# Patient Record
Sex: FEMALE | URBAN - METROPOLITAN AREA
[De-identification: names, ages, dates, MRNs, and addresses within clinical notes are randomized per-mention and may not be internally consistent; named-entity substitution may affect disease eponyms.]

---

## 2018-04-25 ENCOUNTER — APPOINTMENT (RX ONLY)
Dept: URBAN - METROPOLITAN AREA CLINIC 91 | Facility: CLINIC | Age: 58
Setting detail: DERMATOLOGY
End: 2018-04-25

## 2018-04-25 DIAGNOSIS — D22 MELANOCYTIC NEVI: ICD-10-CM

## 2018-04-25 PROBLEM — D48.5 NEOPLASM OF UNCERTAIN BEHAVIOR OF SKIN: Status: ACTIVE | Noted: 2018-04-25

## 2018-04-25 PROBLEM — D22.39 MELANOCYTIC NEVI OF OTHER PARTS OF FACE: Status: ACTIVE | Noted: 2018-04-25

## 2018-04-25 PROCEDURE — 99202 OFFICE O/P NEW SF 15 MIN: CPT | Mod: 25

## 2018-04-25 PROCEDURE — ? BIOPSY BY SHAVE METHOD

## 2018-04-25 PROCEDURE — 11100: CPT

## 2018-04-25 PROCEDURE — ? OBSERVATION

## 2018-04-25 ASSESSMENT — LOCATION SIMPLE DESCRIPTION DERM: LOCATION SIMPLE: LEFT CHEEK

## 2018-04-25 ASSESSMENT — LOCATION ZONE DERM: LOCATION ZONE: FACE

## 2018-04-25 ASSESSMENT — LOCATION DETAILED DESCRIPTION DERM: LOCATION DETAILED: LEFT INFERIOR CENTRAL MALAR CHEEK

## 2018-04-25 NOTE — PROCEDURE: BIOPSY BY SHAVE METHOD
Cryotherapy Text: The wound bed was treated with cryotherapy after the biopsy was performed.
Wound Care: Vaseline
Hemostasis: Aluminum Chloride
Biopsy Type: H and E
Dressing: bandage
Bill For Surgical Tray: no
Size Of Lesion In Cm: 0.7
Render Post-Care Instructions In Note?: yes
Electrodesiccation Text: The wound bed was treated with electrodesiccation after the biopsy was performed.
Biopsy Method: Dermablade
Electrodesiccation And Curettage Text: The wound bed was treated with electrodesiccation and curettage after the biopsy was performed.
Post-care instructions reviewed with the patient in detail. The patient is to keep the biopsy site dry overnight. Remove the bandage and shower as normal with soap and water, dry the area, apply vaseline and a band-aid. Repeat this process daily for five days.
Silver Nitrate Text: The wound bed was treated with silver nitrate after the biopsy was performed.
Anesthesia Type: 1% lidocaine with epinephrine
Billing Type: [employer]
Additional Anesthesia Volume In Cc (Will Not Render If 0): 0
Type Of Destruction Used: Curettage
Detail Level: Detailed
Anesthesia Volume In Cc (Will Not Render If 0): 0.5
Consent was obtained and risks were reviewed including but not limited to scarring, infection, bleeding, scabbing, incomplete removal, nerve damage and allergy to anesthesia.
Notification Instructions: Patient will be notified of biopsy results. However, patient instructed to call the office if not contacted within 2 weeks.

## 2018-05-04 ENCOUNTER — APPOINTMENT (RX ONLY)
Dept: URBAN - METROPOLITAN AREA CLINIC 91 | Facility: CLINIC | Age: 58
Setting detail: DERMATOLOGY
End: 2018-05-04

## 2018-05-04 PROBLEM — C44.722 SQUAMOUS CELL CARCINOMA OF SKIN OF RIGHT LOWER LIMB, INCLUDING HIP: Status: ACTIVE | Noted: 2018-05-04

## 2018-05-04 PROCEDURE — ? BIOPSY BY SHAVE METHOD

## 2018-05-04 PROCEDURE — 11100: CPT

## 2018-05-04 PROCEDURE — ? DIAGNOSIS COMMENT

## 2018-05-04 NOTE — PROCEDURE: MIPS QUALITY
Detail Level: Detailed
Quality 130: Documentation Of Current Medications In The Medical Record: Current Medications Documented
Quality 131: Pain Assessment And Follow-Up: Pain assessment using a standardized tool is documented as negative, no follow-up plan required
Quality 111:Pneumonia Vaccination Status For Older Adults: Pneumococcal Vaccination Previously Received
Quality 110: Preventive Care And Screening: Influenza Immunization: Influenza Immunization previously received during influenza season

## 2018-05-04 NOTE — PROCEDURE: DIAGNOSIS COMMENT
Comment: Previous biopsy consistent with prurigo overlying cyst, can not rule out underlying process
Detail Level: Simple

## 2018-05-04 NOTE — PROCEDURE: BIOPSY BY SHAVE METHOD
Hemostasis: Aluminum Chloride
Detail Level: Detailed
Notification Instructions: Patient will be notified of biopsy results. However, patient instructed to call the office if not contacted within 2 weeks.
Cryotherapy Text: The wound bed was treated with cryotherapy after the biopsy was performed.
Biopsy Type: H and E
Billing Type: [employer]
Bill For Surgical Tray: no
Additional Anesthesia Volume In Cc (Will Not Render If 0): 0
Dressing: bandage
Biopsy Method: Dermablade
Anesthesia Volume In Cc (Will Not Render If 0): 0.5
Post-care instructions reviewed with the patient in detail. The patient is to keep the biopsy site dry overnight. Remove the bandage and shower as normal with soap and water, dry the area, apply vaseline and a band-aid. Repeat this process daily for five days.
Electrodesiccation And Curettage Text: The wound bed was treated with electrodesiccation and curettage after the biopsy was performed.
Wound Care: Vaseline
Size Of Lesion In Cm: 0.7
Render Post-Care Instructions In Note?: yes
Anesthesia Type: 1% lidocaine with epinephrine
Silver Nitrate Text: The wound bed was treated with silver nitrate after the biopsy was performed.
Consent was obtained and risks were reviewed including but not limited to scarring, infection, bleeding, scabbing, incomplete removal, nerve damage and allergy to anesthesia.
Electrodesiccation Text: The wound bed was treated with electrodesiccation after the biopsy was performed.
Type Of Destruction Used: Curettage

## 2018-06-05 ENCOUNTER — TELEPHONE (OUTPATIENT)
Dept: SCHEDULING | Facility: CLINIC | Age: 58
End: 2018-06-05

## 2018-06-05 NOTE — TELEPHONE ENCOUNTER
Pt states her mother was a pt of .  Pt has high cholesterol lab taken last week( no dates given)   pt INS is  [employer] -ID -[id number]- did not go through   ekg in [place]  No appt avail in a time manner

## 2018-06-14 NOTE — TELEPHONE ENCOUNTER
I spoke with Dr. Martin. He can see the pt on Monday, June 18th at 4:40pm. I will call her after 10am today but if she calls in the meantime, please offer her this appt. Thanks.

## 2018-06-15 ENCOUNTER — TELEPHONE (OUTPATIENT)
Dept: CARDIOLOGY | Facility: CLINIC | Age: 58
End: 2018-06-15

## 2018-06-15 NOTE — TELEPHONE ENCOUNTER
I called pt and left a voice message offering her an appt for Monday, June 18th at 4:40pm. I tentatively scheduled her for that time/date. Asked her to call the office to let us know if this works or not.

## 2018-06-25 NOTE — TELEPHONE ENCOUNTER
Dr. Martin can see pt on Friday, Juuly 20th at 3:40pm as a new pt. Called and left message. Mailed out new pt packet to pt.

## 2018-06-27 ENCOUNTER — TELEPHONE (OUTPATIENT)
Dept: SCHEDULING | Facility: CLINIC | Age: 58
End: 2018-06-27

## 2018-06-27 NOTE — TELEPHONE ENCOUNTER
Pt was scheduled for 7/20 and unfortunately cant make that day and time.  [place] on a Friday afternoon.      Pt is requesting to contact her directly to set up a day and time.      Please contact pt.

## 2018-07-10 ENCOUNTER — OFFICE VISIT (OUTPATIENT)
Dept: CARDIOLOGY | Facility: CLINIC | Age: 58
End: 2018-07-10
Payer: COMMERCIAL

## 2018-07-10 VITALS
HEART RATE: 58 BPM | DIASTOLIC BLOOD PRESSURE: 70 MMHG | HEIGHT: 61 IN | WEIGHT: 129 LBS | BODY MASS INDEX: 24.35 KG/M2 | SYSTOLIC BLOOD PRESSURE: 114 MMHG

## 2018-07-10 DIAGNOSIS — Z82.49 FAMILY HISTORY OF PREMATURE CORONARY ARTERY DISEASE: ICD-10-CM

## 2018-07-10 DIAGNOSIS — R94.31 EKG, ABNORMAL: ICD-10-CM

## 2018-07-10 DIAGNOSIS — E78.5 DYSLIPIDEMIA: Primary | ICD-10-CM

## 2018-07-10 DIAGNOSIS — E78.00 HYPERCHOLESTEROLEMIA: ICD-10-CM

## 2018-07-10 DIAGNOSIS — I34.1 MITRAL VALVE PROLAPSE: ICD-10-CM

## 2018-07-10 PROCEDURE — 93000 ELECTROCARDIOGRAM COMPLETE: CPT | Performed by: INTERNAL MEDICINE

## 2018-07-10 PROCEDURE — 99204 OFFICE O/P NEW MOD 45 MIN: CPT | Performed by: INTERNAL MEDICINE

## 2018-07-10 RX ORDER — ACETAMINOPHEN 500 MG
5000 TABLET ORAL DAILY
COMMUNITY

## 2018-07-10 RX ORDER — RISEDRONATE SODIUM 150 MG/1
1 TABLET, FILM COATED ORAL
Refills: 3 | COMMUNITY
Start: 2018-04-15 | End: 2021-10-08

## 2018-07-10 RX ORDER — PAROXETINE HYDROCHLORIDE HEMIHYDRATE 12.5 MG/1
0.5 TABLET, FILM COATED, EXTENDED RELEASE ORAL DAILY
Refills: 1 | COMMUNITY
Start: 2018-04-17

## 2018-07-10 RX ORDER — ROSUVASTATIN CALCIUM 20 MG/1
20 TABLET, COATED ORAL DAILY
Qty: 90 TABLET | Refills: 3 | Status: SHIPPED | OUTPATIENT
Start: 2018-07-10 | End: 2019-09-18

## 2018-07-10 RX ORDER — FLUTICASONE PROPIONATE 50 MCG
2 SPRAY, SUSPENSION (ML) NASAL 2 TIMES DAILY
Refills: 10 | COMMUNITY
Start: 2018-04-23 | End: 2020-07-20

## 2018-07-10 RX ORDER — AZELASTINE 1 MG/ML
2 SPRAY, METERED NASAL 2 TIMES DAILY
Refills: 11 | COMMUNITY
Start: 2018-04-23

## 2018-07-10 RX ORDER — GLUCOSAM/CHONDRO/HERB 149/HYAL 750-100 MG
1 TABLET ORAL 2 TIMES DAILY
COMMUNITY

## 2018-07-10 NOTE — PROGRESS NOTES
I the privilege of seeing your patient today to evaluate the following problems    1 abnormal EKG chronic likely secondary to her known mitral valve prolapse    2 hypercholesterolemia probably familial from her grandparents father.  With significantly elevated total cholesterol 277 and LDL particle #2252.  Statin therapy initiated 2018    3 mitral valve prolapse with previous palpitations, panic attacks    4 remote history of light smoking teenage years    5 family history of premature coronary artery disease in both grandfathers      Current Outpatient Prescriptions:   •  azelastine (ASTELIN) 137 mcg (0.1 %) nasal spray, Administer 2 sprays into each nostril 2 (two) times a day., Disp: , Rfl: 11  •  cholecalciferol, vitamin D3, (VITAMIN D3) 5,000 unit tablet, Take 5,000 Units by mouth daily., Disp: , Rfl:   •  fluticasone (FLONASE) 50 mcg/actuation nasal spray, Administer 2 sprays into each nostril 2 (two) times a day., Disp: , Rfl: 10  •  MAGNESIUM ORAL, Take 2 tablets by mouth nightly., Disp: , Rfl:   •  omega 3-dha-epa-fish oil (FISH OIL) 1,000 mg (120 mg-180 mg) capsule, Take 1 tablet by mouth 2 (two) times a day., Disp: , Rfl:   •  PARoxetine CR (PAXIL-CR) 12.5 mg 24 hr tablet, Take 0.5 tablets by mouth daily., Disp: , Rfl: 1  •  risedronate (ACTONEL) 150 mg tablet, Take 1 tablet by mouth every 30 (thirty) days., Disp: , Rfl: 3  •  rosuvastatin (CRESTOR) 20 mg tablet, Take 1 tablet (20 mg total) by mouth daily., Disp: 90 tablet, Rfl: 3     History 57 year old  postmenopausal very active woman seen for the above problems.  Recently she was started on rosuvastatin 5 mg because of significantly elevated total cholesterol LDL cholesterol and LDL particle number.  With this her lipids have modestly improved.  Coronary risk would include both grandfathers having premature coronary artery disease.  There issmoking for 30 years no diabetes or hypertension.    In addition her EKG was checked recently in in  [place] which was abnormal.  You compared this to her previous EKGs which for many years apparently have shown ST-T wave abnormalities which are thought to be nonspecific and likely related to her mitral valve prolapse.    Approximately 30 years ago she developed panic attacks palpitations and apparently a echocardiogram showed mitral valve prolapse.  Fortunately she has largely outgrown the symptoms and currently only has minor palpitations currently.    Currently she is physically active with competitive tennis and horseback riding.  No chest pain or shortness of breath.    Social history shows she is  2 sons active with tennis and riding no smoking in 30 years alcohol is social use.    Current medications Paxil 6.25 mg daily rosuvastatin 5 mg daily along with the other medications reviewed above.    Allergies to sulfa and Betadine both causing a rash.    Physical examination normal healthy appearing blood pressure 114/70 pulse regular rate and rhythm weight is 129.  Her eyes are normal no JVD normal carotid pulses no bruits lungs are clear grade 1 systolic murmur at the apex with no radiation abdomen nontender no masses organomegaly extremities no edema skin warm and dry normal color well-perfused.    EKG normal sinus rhythm nonspecific ST Tis similar to that seen previously.    Review of a coronary calcium score from 2016 0.    Assessment and plan    1 abnormal EKG likely secondary to mitral valve prolapse.  This is been stable for many years.    2 hypercholesterolemia likely familial to her father's side of the family.  There is a high level LDL particles and along with a family history she needs maximum cholesterol lowering.  I therefore increased the rosuvastatin to 20 mg daily and repeat blood studies in 3 or 4 months.    3 mitral valve prolapse manifested initially as palpitations panic attacks and with EKG abnormalities.  No signs or symptoms currently but this is causing any other problems.   Routine monitoring echocardiogram if things change

## 2018-07-10 NOTE — LETTER
July 10, 2018     Arturo Pruitt MD  [address]  [facility] [address]    Patient: Hannah Hester   YOB: 1960   Date of Visit: 7/10/2018       Dear Dr. Pruitt:    Thank you for referring Hannah Hester to me for evaluation. Below are my notes for this consultation.    If you have questions, please do not hesitate to call me. I look forward to following your patient along with you.         Sincerely,        Abel Galaviz MD        CC: No Recipients  Abel Galaviz MD  7/10/2018  2:34 PM  Sign at close encounter  I the privilege of seeing your patient today to evaluate the following problems    1 abnormal EKG chronic likely secondary to her known mitral valve prolapse    2 hypercholesterolemia probably familial from her grandparents father.  With significantly elevated total cholesterol 277 and LDL particle #2252.  Statin therapy initiated 2018    3 mitral valve prolapse with previous palpitations, panic attacks    4 remote history of light smoking teenage years    5 family history of premature coronary artery disease in both grandfathers      Current Outpatient Prescriptions:   •  azelastine (ASTELIN) 137 mcg (0.1 %) nasal spray, Administer 2 sprays into each nostril 2 (two) times a day., Disp: , Rfl: 11  •  cholecalciferol, vitamin D3, (VITAMIN D3) 5,000 unit tablet, Take 5,000 Units by mouth daily., Disp: , Rfl:   •  fluticasone (FLONASE) 50 mcg/actuation nasal spray, Administer 2 sprays into each nostril 2 (two) times a day., Disp: , Rfl: 10  •  MAGNESIUM ORAL, Take 2 tablets by mouth nightly., Disp: , Rfl:   •  omega 3-dha-epa-fish oil (FISH OIL) 1,000 mg (120 mg-180 mg) capsule, Take 1 tablet by mouth 2 (two) times a day., Disp: , Rfl:   •  PARoxetine CR (PAXIL-CR) 12.5 mg 24 hr tablet, Take 0.5 tablets by mouth daily., Disp: , Rfl: 1  •  risedronate (ACTONEL) 150 mg tablet, Take 1 tablet by mouth every 30 (thirty) days., Disp: , Rfl: 3  •  rosuvastatin (CRESTOR) 20 mg  tablet, Take 1 tablet (20 mg total) by mouth daily., Disp: 90 tablet, Rfl: 3     History 57 year old  postmenopausal very active woman seen for the above problems.  Recently she was started on rosuvastatin 5 mg because of significantly elevated total cholesterol LDL cholesterol and LDL particle number.  With this her lipids have modestly improved.  Coronary risk would include both grandfathers having premature coronary artery disease.  There issmoking for 30 years no diabetes or hypertension.    In addition her EKG was checked recently in in [place] which was abnormal.  You compared this to her previous EKGs which for many years apparently have shown ST-T wave abnormalities which are thought to be nonspecific and likely related to her mitral valve prolapse.    Approximately 30 years ago she developed panic attacks palpitations and apparently a echocardiogram showed mitral valve prolapse.  Fortunately she has largely outgrown the symptoms and currently only has minor palpitations currently.    Currently she is physically active with competitive tennis and horseback riding.  No chest pain or shortness of breath.    Social history shows she is  2 sons active with tennis and riding no smoking in 30 years alcohol is social use.    Current medications Paxil 6.25 mg daily rosuvastatin 5 mg daily along with the other medications reviewed above.    Allergies to sulfa and Betadine both causing a rash.    Physical examination normal healthy appearing blood pressure 114/70 pulse regular rate and rhythm weight is 129.  Her eyes are normal no JVD normal carotid pulses no bruits lungs are clear grade 1 systolic murmur at the apex with no radiation abdomen nontender no masses organomegaly extremities no edema skin warm and dry normal color well-perfused.    EKG normal sinus rhythm nonspecific ST Tis similar to that seen previously.    Review of a coronary calcium score from 2016 0.    Assessment and plan    1 abnormal  EKG likely secondary to mitral valve prolapse.  This is been stable for many years.    2 hypercholesterolemia likely familial to her father's side of the family.  There is a high level LDL particles and along with a family history she needs maximum cholesterol lowering.  I therefore increased the rosuvastatin to 20 mg daily and repeat blood studies in 3 or 4 months.    3 mitral valve prolapse manifested initially as palpitations panic attacks and with EKG abnormalities.  No signs or symptoms currently but this is causing any other problems.  Routine monitoring echocardiogram if things change

## 2018-07-12 NOTE — TELEPHONE ENCOUNTER
Pt cancelled two new pt appts with Dr. Martin in the past and she currently sees Dr. Galaviz. She currently has an appt with him scheduled on 10/8/18.

## 2018-10-11 LAB
ALBUMIN SERPL-MCNC: 4.1 G/DL (ref 3.6–5.1)
ALBUMIN/GLOB SERPL: 1.8 (CALC) (ref 1–2.5)
ALP SERPL-CCNC: 51 U/L (ref 33–130)
ALT SERPL-CCNC: 15 U/L (ref 6–29)
AST SERPL-CCNC: 17 U/L (ref 10–35)
BILIRUB SERPL-MCNC: 0.4 MG/DL (ref 0.2–1.2)
BUN SERPL-MCNC: 16 MG/DL (ref 7–25)
BUN/CREAT SERPL: NORMAL (CALC) (ref 6–22)
CALCIUM SERPL-MCNC: 8.6 MG/DL (ref 8.6–10.4)
CHLORIDE SERPL-SCNC: 106 MMOL/L (ref 98–110)
CHOLEST SERPL-MCNC: 186 MG/DL
CHOLEST/HDLC SERPL: 2 (CALC)
CO2 SERPL-SCNC: 27 MMOL/L (ref 20–32)
CREAT SERPL-MCNC: 0.53 MG/DL (ref 0.5–1.05)
GFR SERPL CREATININE-BSD FRML MDRD: 105 ML/MIN/1.73M2
GLOBULIN SER CALC-MCNC: 2.3 G/DL (CALC) (ref 1.9–3.7)
GLUCOSE SERPL-MCNC: 96 MG/DL (ref 65–99)
HDLC SERPL-MCNC: 91 MG/DL
LDLC SERPL CALC-MCNC: 81 MG/DL (CALC)
NONHDLC SERPL-MCNC: 95 MG/DL (CALC)
POTASSIUM SERPL-SCNC: 4.1 MMOL/L (ref 3.5–5.3)
PROT SERPL-MCNC: 6.4 G/DL (ref 6.1–8.1)
SODIUM SERPL-SCNC: 140 MMOL/L (ref 135–146)
TRIGL SERPL-MCNC: 61 MG/DL

## 2018-10-15 ENCOUNTER — OFFICE VISIT (OUTPATIENT)
Dept: CARDIOLOGY | Facility: CLINIC | Age: 58
End: 2018-10-15
Payer: COMMERCIAL

## 2018-10-15 VITALS
HEIGHT: 61 IN | WEIGHT: 131 LBS | DIASTOLIC BLOOD PRESSURE: 62 MMHG | SYSTOLIC BLOOD PRESSURE: 100 MMHG | BODY MASS INDEX: 24.73 KG/M2

## 2018-10-15 DIAGNOSIS — E78.00 HYPERCHOLESTEROLEMIA: Primary | ICD-10-CM

## 2018-10-15 DIAGNOSIS — R94.31 EKG, ABNORMAL: ICD-10-CM

## 2018-10-15 DIAGNOSIS — I34.1 MITRAL VALVE PROLAPSE: ICD-10-CM

## 2018-10-15 DIAGNOSIS — Z82.49 FAMILY HISTORY OF PREMATURE CORONARY ARTERY DISEASE: ICD-10-CM

## 2018-10-15 PROCEDURE — 99213 OFFICE O/P EST LOW 20 MIN: CPT | Performed by: INTERNAL MEDICINE

## 2018-10-15 NOTE — LETTER
October 15, 2018     Gilbert Hernandez, DO  [address]  [place]  [facility] [address]    Patient: Hannah Hester   YOB: 1960   Date of Visit: 10/15/2018       Dear Dr. Hernandez:    Thank you for referring Hannah Hester to me for evaluation. Below are my notes for this consultation.    If you have questions, please do not hesitate to call me. I look forward to following your patient along with you.         Sincerely,        Abel Galaviz MD        CC: No Recipients  Abel Galaviz MD  10/15/2018  2:52 PM  Sign at close encounter     1. Abnormal EKG with nonspecific ST-T waves chronic likely secondary to her known mitral valve prolapse     2. Hypercholesterolemia probably familial.  With significantly elevated total cholesterol 277 and LDL particle #2252.  Statin therapy initiated 2018 coronary calcium score 2016 of 0     3. Mitral valve prolapse with previous palpitations, panic attacks     4.  remote history of light smoking teenage years     5 . family history of premature coronary artery disease in both grandfathers      Current Outpatient Prescriptions:   •  azelastine (ASTELIN) 137 mcg (0.1 %) nasal spray, Administer 2 sprays into each nostril 2 (two) times a day., Disp: , Rfl: 11  •  cholecalciferol, vitamin D3, (VITAMIN D3) 5,000 unit tablet, Take 5,000 Units by mouth daily., Disp: , Rfl:   •  fluticasone (FLONASE) 50 mcg/actuation nasal spray, Administer 2 sprays into each nostril 2 (two) times a day., Disp: , Rfl: 10  •  MAGNESIUM ORAL, Take 2 tablets by mouth nightly., Disp: , Rfl:   •  omega 3-dha-epa-fish oil (FISH OIL) 1,000 mg (120 mg-180 mg) capsule, Take 1 tablet by mouth 2 (two) times a day., Disp: , Rfl:   •  PARoxetine CR (PAXIL-CR) 12.5 mg 24 hr tablet, Take 0.5 tablets by mouth daily., Disp: , Rfl: 1  •  risedronate (ACTONEL) 150 mg tablet, Take 1 tablet by mouth every 30 (thirty) days., Disp: , Rfl: 3  •  rosuvastatin (CRESTOR) 20 mg tablet, Take 1 tablet (20 mg  total) by mouth daily., Disp: 90 tablet, Rfl: 3    Interim history    Since her last visit of July she has been able to take Crestor 20 mg every other day.  Daily doses caused muscle cramps.  She otherwise reports feeling fine offering no complaints.    Past family and social history are reviewed above.    New blood studies show the cholesterol down 90 points to 186 LDL 81 HDL 91 triglycerides 61 fasting blood sugar 96.  All other chemistries are normal    Examination shows she looks well blood pressure is 100/62 pulse is regular rate and rhythm the weight is 131 lungs are clear heart sounds are normal no murmurs or clicks.  No edema.    Assessment and plan    1.  Hypercholesterolemia familial statin treated lipids are much much better on the above therapy.  LDL cholesterol 81 HDL is 91 total cholesterol 186.  She did have a very high LDL particle number.  She is having difficulty taking higher doses of statin because of muscle cramps.  Fortunately she has a 0 coronary calcium score.  Going forward she will take as much Crestor she can tolerate.  If this becomes a problem we would recommend using ezetimibe 10 mg daily to her regimen    2.  Mitral valve prolapse asymptomatic with no findings today.

## 2018-10-15 NOTE — PROGRESS NOTES
1. Abnormal EKG with nonspecific ST-T waves chronic likely secondary to her known mitral valve prolapse     2. Hypercholesterolemia probably familial.  With significantly elevated total cholesterol 277 and LDL particle #2252.  Statin therapy initiated 2018 coronary calcium score 2016 of 0     3. Mitral valve prolapse with previous palpitations, panic attacks     4.  remote history of light smoking teenage years     5 . family history of premature coronary artery disease in both grandfathers      Current Outpatient Prescriptions:   •  azelastine (ASTELIN) 137 mcg (0.1 %) nasal spray, Administer 2 sprays into each nostril 2 (two) times a day., Disp: , Rfl: 11  •  cholecalciferol, vitamin D3, (VITAMIN D3) 5,000 unit tablet, Take 5,000 Units by mouth daily., Disp: , Rfl:   •  fluticasone (FLONASE) 50 mcg/actuation nasal spray, Administer 2 sprays into each nostril 2 (two) times a day., Disp: , Rfl: 10  •  MAGNESIUM ORAL, Take 2 tablets by mouth nightly., Disp: , Rfl:   •  omega 3-dha-epa-fish oil (FISH OIL) 1,000 mg (120 mg-180 mg) capsule, Take 1 tablet by mouth 2 (two) times a day., Disp: , Rfl:   •  PARoxetine CR (PAXIL-CR) 12.5 mg 24 hr tablet, Take 0.5 tablets by mouth daily., Disp: , Rfl: 1  •  risedronate (ACTONEL) 150 mg tablet, Take 1 tablet by mouth every 30 (thirty) days., Disp: , Rfl: 3  •  rosuvastatin (CRESTOR) 20 mg tablet, Take 1 tablet (20 mg total) by mouth daily., Disp: 90 tablet, Rfl: 3    Interim history    Since her last visit of July she has been able to take Crestor 20 mg every other day.  Daily doses caused muscle cramps.  She otherwise reports feeling fine offering no complaints.    Past family and social history are reviewed above.    New blood studies show the cholesterol down 90 points to 186 LDL 81 HDL 91 triglycerides 61 fasting blood sugar 96.  All other chemistries are normal    Examination shows she looks well blood pressure is 100/62 pulse is regular rate and rhythm the weight is  131 lungs are clear heart sounds are normal no murmurs or clicks.  No edema.    Assessment and plan    1.  Hypercholesterolemia familial statin treated lipids are much much better on the above therapy.  LDL cholesterol 81 HDL is 91 total cholesterol 186.  She did have a very high LDL particle number.  She is having difficulty taking higher doses of statin because of muscle cramps.  Fortunately she has a 0 coronary calcium score.  Going forward she will take as much Crestor she can tolerate.  If this becomes a problem we would recommend using ezetimibe 10 mg daily to her regimen    2.  Mitral valve prolapse asymptomatic with no findings today.

## 2018-10-27 LAB
APO B SERPL-MCNC: 77 MG/DL (ref 49–103)
CHOLEST SERPL-MCNC: 214 MG/DL
CHOLEST/HDLC SERPL: 2.5 CALC
HDLC SERPL-MCNC: 86 MG/DL
HLD.LARGE SERPL-SCNC: 7443 NMOL/L (ref 3966–11938)
LDL SERPL QN: 226.1 ANGSTROM
LDL SERPL-SCNC: 1264 NMOL/L (ref 732–2035)
LDL SMALL SERPL-SCNC: 156 NMOL/L (ref 75–452)
LDLC REAL SIZE PAT SERPL: ABNORMAL PATTERN
LDLC SERPL CALC-MCNC: 111 MG/DL
LPA SERPL-SCNC: 143 NMOL/L
NONHDLC SERPL-MCNC: 128 MG/DL (CALC)
QST LDL MEDIUM: 251 NMOL/L (ref 122–498)
TRIGL SERPL-MCNC: 84 MG/DL

## 2018-11-06 ENCOUNTER — TELEPHONE (OUTPATIENT)
Dept: CARDIOLOGY | Facility: CLINIC | Age: 58
End: 2018-11-06

## 2018-11-06 NOTE — TELEPHONE ENCOUNTER
Pt requesting to have call back from doctor regarding test results.  Please contact pt [phone]

## 2018-11-09 NOTE — TELEPHONE ENCOUNTER
Pt calling back to get results that were done by [facility]. 2nd call. Pt can be reached at [phone]

## 2018-12-21 ENCOUNTER — APPOINTMENT (RX ONLY)
Dept: URBAN - METROPOLITAN AREA CLINIC 91 | Facility: CLINIC | Age: 58
Setting detail: DERMATOLOGY
End: 2018-12-21

## 2018-12-21 DIAGNOSIS — D22 MELANOCYTIC NEVI: ICD-10-CM

## 2018-12-21 DIAGNOSIS — L81.4 OTHER MELANIN HYPERPIGMENTATION: ICD-10-CM

## 2018-12-21 DIAGNOSIS — L56.8 OTHER SPECIFIED ACUTE SKIN CHANGES DUE TO ULTRAVIOLET RADIATION: ICD-10-CM

## 2018-12-21 DIAGNOSIS — Z71.89 OTHER SPECIFIED COUNSELING: ICD-10-CM

## 2018-12-21 PROBLEM — D22.5 MELANOCYTIC NEVI OF TRUNK: Status: ACTIVE | Noted: 2018-12-21

## 2018-12-21 PROBLEM — L70.0 ACNE VULGARIS: Status: ACTIVE | Noted: 2018-12-21

## 2018-12-21 PROCEDURE — ? COUNSELING

## 2018-12-21 PROCEDURE — 99214 OFFICE O/P EST MOD 30 MIN: CPT

## 2018-12-21 ASSESSMENT — LOCATION DETAILED DESCRIPTION DERM
LOCATION DETAILED: RIGHT PROXIMAL DORSAL FOREARM
LOCATION DETAILED: RIGHT PROXIMAL POSTERIOR UPPER ARM
LOCATION DETAILED: STERNAL NOTCH
LOCATION DETAILED: LEFT DISTAL POSTERIOR UPPER ARM
LOCATION DETAILED: LEFT FOREHEAD
LOCATION DETAILED: RIGHT SUPERIOR MEDIAL UPPER BACK
LOCATION DETAILED: LEFT PROXIMAL DORSAL FOREARM
LOCATION DETAILED: RIGHT INFERIOR MEDIAL MIDBACK

## 2018-12-21 ASSESSMENT — LOCATION SIMPLE DESCRIPTION DERM
LOCATION SIMPLE: RIGHT POSTERIOR UPPER ARM
LOCATION SIMPLE: LEFT FOREHEAD
LOCATION SIMPLE: LEFT FOREARM
LOCATION SIMPLE: RIGHT FOREARM
LOCATION SIMPLE: RIGHT LOWER BACK
LOCATION SIMPLE: RIGHT UPPER BACK
LOCATION SIMPLE: CHEST
LOCATION SIMPLE: LEFT POSTERIOR UPPER ARM

## 2018-12-21 ASSESSMENT — LOCATION ZONE DERM
LOCATION ZONE: TRUNK
LOCATION ZONE: ARM
LOCATION ZONE: FACE

## 2019-01-02 ENCOUNTER — TELEPHONE (OUTPATIENT)
Dept: SCHEDULING | Facility: CLINIC | Age: 59
End: 2019-01-02

## 2019-01-02 NOTE — TELEPHONE ENCOUNTER
Pt called to report medication, crestor 20mg daily is giving her muscle aches/soreness/spasms and pt would like to switch to lipitor as discussed with Dr. Galaviz     Please send new prescription to [facility] in [place], [place] and pt is requesting a call back [phone] once new medication is called into the pharmacy.

## 2019-01-03 ENCOUNTER — TELEPHONE (OUTPATIENT)
Dept: CARDIOLOGY | Facility: CLINIC | Age: 59
End: 2019-01-03

## 2019-01-03 NOTE — TELEPHONE ENCOUNTER
Per Dr. Galaviz, have patient stop the Crestor 20 mg for 2 weeks, then start the Crestor 20 mg TAKING 1/2 TAB for 3 days per week. LVM on patient home number, BB

## 2019-04-16 ENCOUNTER — APPOINTMENT (RX ONLY)
Dept: URBAN - METROPOLITAN AREA CLINIC 91 | Facility: CLINIC | Age: 59
Setting detail: DERMATOLOGY
End: 2019-04-16

## 2019-04-16 DIAGNOSIS — L98.8 OTHER SPECIFIED DISORDERS OF THE SKIN AND SUBCUTANEOUS TISSUE: ICD-10-CM

## 2019-04-16 PROBLEM — E78.5 HYPERLIPIDEMIA, UNSPECIFIED: Status: ACTIVE | Noted: 2019-04-16

## 2019-04-16 PROCEDURE — ? DIAGNOSIS COMMENT

## 2019-04-16 PROCEDURE — ? COUNSELING

## 2019-04-16 PROCEDURE — ? COSMETIC CONSULTATION: FILLERS

## 2019-04-16 PROCEDURE — ? COSMETIC CONSULTATION: BOTULINUM TOXIN

## 2019-04-16 ASSESSMENT — LOCATION ZONE DERM: LOCATION ZONE: FACE

## 2019-04-16 ASSESSMENT — LOCATION DETAILED DESCRIPTION DERM
LOCATION DETAILED: LEFT SUPERIOR LATERAL MALAR CHEEK
LOCATION DETAILED: LEFT INFERIOR CENTRAL MALAR CHEEK
LOCATION DETAILED: LEFT INFERIOR FOREHEAD
LOCATION DETAILED: RIGHT SUPERIOR LATERAL MALAR CHEEK
LOCATION DETAILED: RIGHT INFERIOR FOREHEAD
LOCATION DETAILED: RIGHT INFERIOR MEDIAL MALAR CHEEK

## 2019-04-16 ASSESSMENT — LOCATION SIMPLE DESCRIPTION DERM
LOCATION SIMPLE: LEFT FOREHEAD
LOCATION SIMPLE: RIGHT CHEEK
LOCATION SIMPLE: RIGHT FOREHEAD
LOCATION SIMPLE: LEFT CHEEK

## 2019-05-01 ENCOUNTER — APPOINTMENT (RX ONLY)
Dept: URBAN - METROPOLITAN AREA CLINIC 91 | Facility: CLINIC | Age: 59
Setting detail: DERMATOLOGY
End: 2019-05-01

## 2019-05-01 DIAGNOSIS — L98.8 OTHER SPECIFIED DISORDERS OF THE SKIN AND SUBCUTANEOUS TISSUE: ICD-10-CM

## 2019-05-01 PROBLEM — L23.7 ALLERGIC CONTACT DERMATITIS DUE TO PLANTS, EXCEPT FOOD: Status: ACTIVE | Noted: 2019-05-01

## 2019-05-01 PROBLEM — F41.9 ANXIETY DISORDER, UNSPECIFIED: Status: ACTIVE | Noted: 2019-05-01

## 2019-05-01 PROCEDURE — ? BOTOX (U OR CC)

## 2019-05-01 PROCEDURE — ? FILLERS (CC)

## 2019-05-01 PROCEDURE — ? COUNSELING

## 2019-05-01 ASSESSMENT — LOCATION DETAILED DESCRIPTION DERM
LOCATION DETAILED: RIGHT CENTRAL MALAR CHEEK
LOCATION DETAILED: RIGHT INFERIOR MEDIAL MALAR CHEEK
LOCATION DETAILED: RIGHT SUPERIOR MEDIAL BUCCAL CHEEK
LOCATION DETAILED: NASAL ROOT
LOCATION DETAILED: RIGHT CENTRAL EYEBROW
LOCATION DETAILED: LEFT MEDIAL EYEBROW
LOCATION DETAILED: RIGHT NASOLABIAL FOLD
LOCATION DETAILED: LEFT NASOLABIAL FOLD
LOCATION DETAILED: LEFT CENTRAL EYEBROW
LOCATION DETAILED: RIGHT MEDIAL EYEBROW
LOCATION DETAILED: LEFT SUPERIOR MEDIAL BUCCAL CHEEK
LOCATION DETAILED: LEFT INFERIOR MEDIAL MALAR CHEEK
LOCATION DETAILED: LEFT INFERIOR TEMPLE
LOCATION DETAILED: LEFT CENTRAL MALAR CHEEK
LOCATION DETAILED: RIGHT SUPERIOR CENTRAL MALAR CHEEK
LOCATION DETAILED: RIGHT MID TEMPLE
LOCATION DETAILED: LEFT SUPERIOR LATERAL MALAR CHEEK

## 2019-05-01 ASSESSMENT — LOCATION SIMPLE DESCRIPTION DERM
LOCATION SIMPLE: LEFT LIP
LOCATION SIMPLE: RIGHT EYEBROW
LOCATION SIMPLE: RIGHT LIP
LOCATION SIMPLE: RIGHT CHEEK
LOCATION SIMPLE: LEFT CHEEK
LOCATION SIMPLE: LEFT TEMPLE
LOCATION SIMPLE: RIGHT TEMPLE
LOCATION SIMPLE: NOSE
LOCATION SIMPLE: LEFT EYEBROW

## 2019-05-01 ASSESSMENT — LOCATION ZONE DERM
LOCATION ZONE: NOSE
LOCATION ZONE: LIP
LOCATION ZONE: FACE

## 2019-05-01 NOTE — PROCEDURE: BOTOX (U OR CC)
Additional Area 3 Units: 0
Quantity Per Injection Site (Units Or Cc): 4 U
Post-Care Instructions: Patient instructed to not lie down for 4 hours and limit physical activity for 24 hours. Patient instructed not to travel by airplane for 48 hours.
Price (Use Numbers Only, No Special Characters Or $): [address]
Nasal Root Units/Cc: 4
Detail Level: Detailed
Periorbital Skin Units/Cc: 1 [facility]
Glabellar Complex Units: 217 [facility]
Including Pricing Information In The Note: No
Consent: Written consent obtained. Risks include but not limited to lid/brow ptosis, bruising, swelling, diplopia, temporary effect, incomplete chemical denervation.
Quantity Per Injection Site (Units Or Cc): 8 U
Expiration Date (Month Year): 80\1099
Lot #: [id number]
Document As Units Or Cc?: units

## 2019-05-01 NOTE — PROCEDURE: FILLERS (CC)
Quantity Per Injection Site (Cc): 0.05 cc
Additional Anesthesia Volume In Cc: 6
Post-Care Instructions: Patient instructed to apply ice to reduce swelling.
Expiration Date (Month Year): 2020-01-23
Additional Area 1 Volume In Cc: 0
Anesthesia Volume In Cc: 0.5
Detail Level: Detailed
Nasolabial Folds Filler Volume In Cc: 1
Filler: Juvederm Ultra XC
Price (Use Numbers Only, No Special Characters Or $): [address] [employer]
Consent: Written consent obtained. Risks include but not limited to bruising, beading, irregular texture, ulceration, infection, allergic reaction, scar formation, incomplete augmentation, temporary nature, procedural pain.
Lot #: [id number]

## 2019-05-13 ENCOUNTER — TELEPHONE (OUTPATIENT)
Dept: CARDIOLOGY | Facility: CLINIC | Age: 59
End: 2019-05-13

## 2019-05-13 NOTE — TELEPHONE ENCOUNTER
Dr. Galaviz patient.      Patient has an appointment to see Dr. Galaviz on 5/20/18 and wants to know if Dr. Galaviz would like her to have any lab work performed in anticipation of her visit. She had her last screen of blood work in 10/2018. She can be reached at [phone], thanks.

## 2019-05-20 ENCOUNTER — OFFICE VISIT (OUTPATIENT)
Dept: CARDIOLOGY | Facility: CLINIC | Age: 59
End: 2019-05-20
Payer: COMMERCIAL

## 2019-05-20 VITALS
SYSTOLIC BLOOD PRESSURE: 100 MMHG | WEIGHT: 133 LBS | HEIGHT: 61 IN | BODY MASS INDEX: 25.11 KG/M2 | DIASTOLIC BLOOD PRESSURE: 68 MMHG

## 2019-05-20 DIAGNOSIS — R94.31 EKG, ABNORMAL: ICD-10-CM

## 2019-05-20 DIAGNOSIS — E78.00 PURE HYPERCHOLESTEROLEMIA: Primary | ICD-10-CM

## 2019-05-20 DIAGNOSIS — E78.00 HYPERCHOLESTEROLEMIA: ICD-10-CM

## 2019-05-20 DIAGNOSIS — I34.1 MITRAL VALVE PROLAPSE: ICD-10-CM

## 2019-05-20 DIAGNOSIS — I34.1 MVP (MITRAL VALVE PROLAPSE): ICD-10-CM

## 2019-05-20 PROCEDURE — 99213 OFFICE O/P EST LOW 20 MIN: CPT | Performed by: INTERNAL MEDICINE

## 2019-05-20 PROCEDURE — 93000 ELECTROCARDIOGRAM COMPLETE: CPT | Performed by: INTERNAL MEDICINE

## 2019-05-20 RX ORDER — EZETIMIBE 10 MG/1
10 TABLET ORAL NIGHTLY
Qty: 90 TABLET | Refills: 1 | Status: SHIPPED | OUTPATIENT
Start: 2019-05-20 | End: 2019-09-18

## 2019-05-20 RX ORDER — ASPIRIN 81 MG/1
81 TABLET ORAL DAILY
Qty: 90 TABLET | Refills: 1 | Status: SHIPPED | OUTPATIENT
Start: 2019-05-20 | End: 2023-06-05 | Stop reason: SDUPTHER

## 2019-05-20 RX ORDER — ACETAMINOPHEN 160 MG/5ML
200 SUSPENSION, ORAL (FINAL DOSE FORM) ORAL DAILY
COMMUNITY

## 2019-05-20 RX ORDER — DIMENHYDRINATE 50 MG
50 TABLET ORAL NIGHTLY PRN
COMMUNITY
End: 2019-05-20

## 2019-05-20 NOTE — LETTER
May 20, 2019     Gilbert Hernandez, DO  [address]  [facility] [address]    Patient: Hannah Hester   YOB: 1960   Date of Visit: 5/20/2019       Dear Dr. Hernandez:    Thank you for referring Hannah Hester to me for evaluation. Below are my notes for this consultation.    If you have questions, please do not hesitate to call me. I look forward to following your patient along with you.         Sincerely,        Abel Galaviz MD        CC: No Recipients  Abel Galaviz MD  5/20/2019  8:39 AM  Sign at close encounter     1. Abnormal EKG with nonspecific ST-T waves chronic likely secondary to her known mitral valve prolapse.   2. Hypercholesterolemia probably familial.  With significantly elevated total cholesterol 277 and LDL particle #2252.  Statin therapy initiated 2018 coronary calcium score 2016 of 0   3. Mitral valve prolapse with previous palpitations, panic attacks   4.  remote history of light smoking    5 . family history of premature coronary artery disease in both grandfathers

## 2019-05-20 NOTE — PROGRESS NOTES
1. Abnormal EKG with nonspecific ST-T waves chronic likely secondary to her known mitral valve prolapse.   2. Hypercholesterolemia probably familial.  With significantly elevated total cholesterol 277 and LDL particle #[id number].  Elevated LP(a).  Stin therapy initiated 2018 coronary calcium score 2016 of 0.  Statin dosing limited by myalgias  3. Mitral valve prolapse with previous palpitations, panic attacks   4.  remote history of light smoking    5 . family history of premature coronary artery disease in both grandfathers      Interim history: Patient was seen today to reassess the above problems as reviewed above.  Since her last visit of 6 months ago she reports feeling well and offers no complaints.  There is been no chest pain shortness of breath palpitations dizziness edema or compliance problems.  The dose of rosuvastatin had to be reduced because of muscle spasms which have improved but not completely resolved    The past family social history are further reviewed and updated above.    Physical examination healthy-appearing blood pressure 100/68 pulse regular rate and rhythm at 80 and the weight is 133 which is stable.  Her eyes are normal, no JVD normal carotid pulses.  The lungs are clear.  Heart sounds are normal with a early systolic click but no murmur.  Abdomen no masses organomegaly.  Extremities warm and dry no edema.    Lab studies last performed October 2018 the total cholesterol 214  HDL 86 triglycerides 84.  Advanced lipid testings include APO B level 77 which is normal and LDL particle number number of [id number] which is borderline.  The LP(a) elevated to 143.  Chemistry panel normal.    EKG: Normal sinus rhythm nonspecific ST-T wave abnormality stable        Assessment and plan:    1.  Hyperlipidemia as noted above not fully controlled on present dose of statin.  Higher doses of cause muscle symptoms.  Currently the LDL is 111 APO B level normal at 77 non-HDL cholesterol slightly high at 128  but LP(a) elevated 143.  More intensive LDL cholesterol lowering is therefore indicated given the family history.  I have therefore added ezetimibe 10 mg daily to the regimen and will repeat lipid values in few months.  With the elevated LP(a) causing a potential prothrombotic state low-dose aspirin is recommended    2.  Mitral valve prolapse with no signs or symptoms this is causing a problem.  Routine monitoring.  Echocardiogram if something changes.    3.  Abnormal EKG with nonspecific ST-T's which is been seen in the past are likely secondary to her known mitral valve prolapse.  Stable.  Routine monitoring.

## 2019-09-12 ENCOUNTER — TELEPHONE (OUTPATIENT)
Dept: SCHEDULING | Facility: CLINIC | Age: 59
End: 2019-09-12

## 2019-09-12 NOTE — TELEPHONE ENCOUNTER
Pt called to reschedule her f/up appt with Dr Ng but has limited availability.  Is there anyway pt can be seen prior to heading home to [place] for the winter?  Pt will be out of the country for 9[place]23-10/9 and getting ready to go back to [place] on 10/24. Pt's appt on 9/16 has been margaux.  Pt can be reached at [phone].

## 2019-09-16 DIAGNOSIS — E78.00 PURE HYPERCHOLESTEROLEMIA: ICD-10-CM

## 2019-09-16 RX ORDER — EZETIMIBE 10 MG/1
10 TABLET ORAL NIGHTLY
Qty: 90 TABLET | Refills: 3 | Status: CANCELLED | OUTPATIENT
Start: 2019-09-16 | End: 2020-09-15

## 2019-09-16 NOTE — TELEPHONE ENCOUNTER
Received refill request fax from [facility] pharmacy in [facility] for refill on Zetia. Left pt a detailed vm asking that pt call back to verify which pharmacy she would like for Zetia to go to, as there are multiple pharmacies on file for her.

## 2019-09-18 ENCOUNTER — OFFICE VISIT (OUTPATIENT)
Dept: CARDIOLOGY | Facility: CLINIC | Age: 59
End: 2019-09-18
Payer: COMMERCIAL

## 2019-09-18 VITALS
BODY MASS INDEX: 24.92 KG/M2 | RESPIRATION RATE: 16 BRPM | HEART RATE: 62 BPM | HEIGHT: 61 IN | WEIGHT: 132 LBS | SYSTOLIC BLOOD PRESSURE: 110 MMHG | DIASTOLIC BLOOD PRESSURE: 80 MMHG

## 2019-09-18 DIAGNOSIS — E78.00 HYPERCHOLESTEROLEMIA: Primary | ICD-10-CM

## 2019-09-18 DIAGNOSIS — Z82.49 FAMILY HISTORY OF PREMATURE CORONARY ARTERY DISEASE: ICD-10-CM

## 2019-09-18 DIAGNOSIS — R94.31 EKG, ABNORMAL: ICD-10-CM

## 2019-09-18 DIAGNOSIS — R73.01 IMPAIRED FASTING GLUCOSE: ICD-10-CM

## 2019-09-18 LAB
ALBUMIN SERPL-MCNC: 4.2 G/DL (ref 3.6–5.1)
ALBUMIN/GLOB SERPL: 2 (CALC) (ref 1–2.5)
ALP SERPL-CCNC: 44 U/L (ref 33–130)
ALT SERPL-CCNC: 14 U/L (ref 6–29)
AST SERPL-CCNC: 15 U/L (ref 10–35)
BILIRUB SERPL-MCNC: 0.3 MG/DL (ref 0.2–1.2)
BUN SERPL-MCNC: 13 MG/DL (ref 7–25)
BUN/CREAT SERPL: ABNORMAL (CALC) (ref 6–22)
CALCIUM SERPL-MCNC: 9.3 MG/DL (ref 8.6–10.4)
CHLORIDE SERPL-SCNC: 107 MMOL/L (ref 98–110)
CHOLEST SERPL-MCNC: 221 MG/DL
CHOLEST/HDLC SERPL: 3.1 (CALC)
CO2 SERPL-SCNC: 29 MMOL/L (ref 20–32)
CREAT SERPL-MCNC: 0.59 MG/DL (ref 0.5–1.05)
GLOBULIN SER CALC-MCNC: 2.1 G/DL (CALC) (ref 1.9–3.7)
GLUCOSE SERPL-MCNC: 107 MG/DL (ref 65–99)
HDLC SERPL-MCNC: 71 MG/DL
LDLC SERPL CALC-MCNC: 129 MG/DL (CALC)
NONHDLC SERPL-MCNC: 150 MG/DL (CALC)
POTASSIUM SERPL-SCNC: 4.4 MMOL/L (ref 3.5–5.3)
PROT SERPL-MCNC: 6.3 G/DL (ref 6.1–8.1)
QUEST EGFR NON-AFR. AMERICAN: 100 ML/MIN/1.73M2
SODIUM SERPL-SCNC: 142 MMOL/L (ref 135–146)
TRIGL SERPL-MCNC: 99 MG/DL

## 2019-09-18 PROCEDURE — 93000 ELECTROCARDIOGRAM COMPLETE: CPT | Performed by: INTERNAL MEDICINE

## 2019-09-18 PROCEDURE — 99243 OFF/OP CNSLTJ NEW/EST LOW 30: CPT | Performed by: INTERNAL MEDICINE

## 2019-09-18 NOTE — LETTER
2019     Gilbert Hernandez, DO  [address], [address]  [facility] [address]    Patient: Hannah Hester  YOB: 1960  Date of Visit: 2019      Dear Dr. Hernandez:    Thank you for referring Hannah Hester to me for evaluation. Below are my notes for this consultation.    If you have questions, please do not hesitate to call me. I look forward to following your patient along with you.         Sincerely,        Odilia Ng MD        CC: No Recipients  Odilia Ng MD  2019 12:51 PM  Signed       Interventional Cardiology  Consultation         HPI     I saw Nohelia today in consultation for dyslipidemia with family history of coronary artery disease and statin intolerance.    We discussed her previous history of statin intolerance at length    Planning a trip to [place] for two weeks    Plays tennis, rides horses, walks, pilates. No chest pain, shortness of breath, palpitations, dizziness.    Endocrinologist- Mira Anguiano, [facility]    All recent available medical records were reviewed.    Past Medical History:   -Dyslipidemia.  Statin intolerant on crestor 20 mg and 10 mg.  Does tolerate red yeast rice 2400 mg daily.  -Baseline abnormal EKG  -Mitral valve prolapse.  Clinically silent.  -Prediabetes.  Fasting blood glucose 107.    Family History: Both grandfathers with early CAD.  Father had high cholesterol but  of MM in 40s. Mother may have had a mini stroke at age 85 also dementia. Sister with high cholesterol not on any medications.    Social History: Lucia is  with 2 sons.  She lives in [place] half the year.  She has never been a smoker.    Current Medications:   Aspirin 81 mg daily  Paroxetine 12.5 mg daily  Flonase  Zyrtec    Current Supplements:   Red yeast rice 1200 mg bid (resQ)  Fish oil (metagenics 720 mg bid)  Systemic enzymes (wobenzyme)  DIM  (prescribed choice- 150 mg bid)  probiotic    Allergies:  Povidone-iodine and Sulfa  (sulfonamide antibiotics)    14 point review of systems is negative except as noted in HPI    Objective     Vitals:    09/18/19 1015   BP: 110/80   Pulse: 62   Resp: 16       Wt Readings from Last 3 Encounters:   09/18/19 59.9 kg (132 lb)   05/20/19 60.3 kg (133 lb)   10/15/18 59.4 kg (131 lb)       Physical Exam:  General Appearance:  Alert, no distress   Head:  Normocephalic, without obvious abnormality, atraumatic   Eyes:  Conjunctiva/corneas clear, EOM's intact   Neck: No thyroid enlargement. JVD<8. No bruits    Lungs:   Clear to auscultation bilaterally   Heart:  Regular rhythm,no murmur heard   Abdomen:   Soft, non-tender, no masses, no organomegaly   Vascular: Pulses 2+ and symmetric all extremities, no carotid bruit or jugular vein distention   Musculoskeletal:  Skin: No injury or deformity  Skin color, texture, turgor normal, no rashes or lesions, no cyanosis    Extremities: No edema   Behavior/Emotional: Appropriate, cooperative   Neurologic:                    AAOx3, grossly intact neurologically    ECG   Sinus rhythm at 62 bpm.  Nonspecific ST and T wave abnormalities that have been stable.    Labs   Lab Results   Component Value Date    WBC 3.99 08/29/2017    HGB 13.7 08/29/2017    HCT 40.3 08/29/2017     08/29/2017    CHOL 214 (H) 10/24/2018    TRIG 84 10/24/2018    HDL 86 10/24/2018    LDLCALC 111 (H) 10/24/2018    ALT 15 10/10/2018    AST 17 10/10/2018     10/10/2018    K 4.1 10/10/2018     10/10/2018    CREATININE 0.53 10/10/2018    BUN 16 10/10/2018    CO2 27 10/10/2018       Imaging    Coronary calcium score June 2016.  Composite score of 0.  Noncardiac portions were normal.    Assessment/Plan     Dyslipidemia with family history of CAD.  Also with elevated LP(a).  She is asymptomatic with a coronary calcium score of 0 in 2016.  She has not tolerated statin therapy but is currently tolerating red yeast rice.  She never started the Zetia.  Plan is to recheck coronary calcium  score looking for any signs of progression.  If there has been any progression then we will have to optimize her cholesterol using statin or non-statin treatments.  Red yeast rice is a new supplement for her and I would like her to have her cholesterol checked in a few months to reflect impact.    Abnormal EKG with nonspecific T wave changes which has been stable.    History of mitral valve prolapse.  Previously with palpitations but currently asymptomatic.  No murmur on exam.    Family history of premature coronary disease.  She is clinically asymptomatic with coronary calcium score of 0.  We are working on all risk factor modification as above.  In addition we discussed prediabetes which is suggested by her fasting blood sugar of 107.  We discussed lifestyle therapies for this including cutting out processed foods and sugars and very importantly intermittent fasting.  I recommended that she read the diabetes code by Alexis Mcelroy.  Weight loss especially from around the midline is important.    Prediabetes with elevated fasting blood glucose.  As above.    Odilia Ng MD  9/18/2019

## 2019-09-18 NOTE — PATIENT INSTRUCTIONS
READ: The Diabetes Code by Alexis Mcelroy    Recheck your coronary calcium score, if any progression we will talk about restarting statin or non-statin therapy  In the meantime, ok to continue red yeast rice and we will recheck cholesterol before I see you    Eat lots of non-starchy vegetables  Cut out sugars (corn syrup, artificial sweeteners)  Cut out refined carbohydrates (bread, pasta, cake, cookies, bagels, etc)  Cut out processed foods (anything in a package that doesn't need refrigeration or has unrecognizable ingredients)  Healthy fats are good for you (olive oil, nuts/seeds/avocado)  Moderate amount of protein 4-6 oz per meal

## 2019-09-18 NOTE — PROGRESS NOTES
Interventional Cardiology  Consultation         HPI     I saw Nohelia today in consultation for dyslipidemia with family history of coronary artery disease and statin intolerance.    We discussed her previous history of statin intolerance at length    Planning a trip to [place] for two weeks    Plays tennis, rides horses, walks, pilates. No chest pain, shortness of breath, palpitations, dizziness.    Endocrinologist- Mira Anguiano, [facility]    All recent available medical records were reviewed.    Past Medical History:   -Dyslipidemia.  Statin intolerant on crestor 20 mg and 10 mg.  Does tolerate red yeast rice 2400 mg daily.  -Baseline abnormal EKG  -Mitral valve prolapse.  Clinically silent.  -Prediabetes.  Fasting blood glucose 107.    Family History: Both grandfathers with early CAD.  Father had high cholesterol but  of MM in 40s. Mother may have had a mini stroke at age 85 also dementia. Sister with high cholesterol not on any medications.    Social History: Lucia is  with 2 sons.  She lives in [place] half the year.  She has never been a smoker.    Current Medications:   Aspirin 81 mg daily  Paroxetine 12.5 mg daily  Flonase  Zyrtec    Current Supplements:   Red yeast rice 1200 mg bid (resQ)  Fish oil (metagenics 720 mg bid)  Systemic enzymes (wobenzyme)  DIM  (prescribed choice- 150 mg bid)  probiotic    Allergies:  Povidone-iodine and Sulfa (sulfonamide antibiotics)    14 point review of systems is negative except as noted in HPI    Objective     Vitals:    19 1015   BP: 110/80   Pulse: 62   Resp: 16       Wt Readings from Last 3 Encounters:   19 59.9 kg (132 lb)   19 60.3 kg (133 lb)   10/15/18 59.4 kg (131 lb)       Physical Exam:  General Appearance:  Alert, no distress   Head:  Normocephalic, without obvious abnormality, atraumatic   Eyes:  Conjunctiva/corneas clear, EOM's intact   Neck: No thyroid enlargement. JVD<8. No bruits    Lungs:   Clear to  auscultation bilaterally   Heart:  Regular rhythm,no murmur heard   Abdomen:   Soft, non-tender, no masses, no organomegaly   Vascular: Pulses 2+ and symmetric all extremities, no carotid bruit or jugular vein distention   Musculoskeletal:  Skin: No injury or deformity  Skin color, texture, turgor normal, no rashes or lesions, no cyanosis    Extremities: No edema   Behavior/Emotional: Appropriate, cooperative   Neurologic:                    AAOx3, grossly intact neurologically    ECG   Sinus rhythm at 62 bpm.  Nonspecific ST and T wave abnormalities that have been stable.    Labs   Lab Results   Component Value Date    WBC 3.99 08/29/2017    HGB 13.7 08/29/2017    HCT 40.3 08/29/2017     08/29/2017    CHOL 214 (H) 10/24/2018    TRIG 84 10/24/2018    HDL 86 10/24/2018    LDLCALC 111 (H) 10/24/2018    ALT 15 10/10/2018    AST 17 10/10/2018     10/10/2018    K 4.1 10/10/2018     10/10/2018    CREATININE 0.53 10/10/2018    BUN 16 10/10/2018    CO2 27 10/10/2018     Outside labs dated 9/17/2009 18 were reviewed.  Total cholesterol 221, HDL 71, triglycerides 99, , non-  Glucose 107  Creatinine 0.59  AST 15, ALT 14    Imaging    Coronary calcium score June 2016.  Composite score of 0.  Noncardiac portions were normal.    Assessment/Plan     Dyslipidemia with family history of CAD.  Also with elevated LP(a).  She is asymptomatic with a coronary calcium score of 0 in 2016.  She has not tolerated statin therapy but is currently tolerating red yeast rice.  She never started the Zetia.  Plan is to recheck coronary calcium score looking for any signs of progression.  If there has been any progression then we will have to optimize her cholesterol using statin or non-statin treatments.  Red yeast rice is a new supplement for her and I would like her to have her cholesterol checked in a few months to reflect impact.    Abnormal EKG with nonspecific T wave changes which has been stable.    History  of mitral valve prolapse.  Previously with palpitations but currently asymptomatic.  No murmur on exam.    Family history of premature coronary disease.  She is clinically asymptomatic with coronary calcium score of 0.  We are working on all risk factor modification as above.  In addition we discussed prediabetes which is suggested by her fasting blood sugar of 107.  We discussed lifestyle therapies for this including cutting out processed foods and sugars and very importantly intermittent fasting.  I recommended that she read the diabetes code by Alexis Mcelroy.  Weight loss especially from around the midline is important.    Prediabetes with elevated fasting blood glucose.  As above.    Odilia Ng MD  9/18/2019

## 2019-09-25 ENCOUNTER — TELEPHONE (OUTPATIENT)
Dept: CARDIOLOGY | Facility: CLINIC | Age: 59
End: 2019-09-25

## 2019-09-25 NOTE — TELEPHONE ENCOUNTER
Spoke with Mrs. Hester she stated she is NOT taking the Zetia. DO NOT REFILL. Notified [facility]  In [place], [place]. [phone], BB

## 2019-10-16 ENCOUNTER — HOSPITAL ENCOUNTER (OUTPATIENT)
Dept: RADIOLOGY | Facility: HOSPITAL | Age: 59
Discharge: HOME | End: 2019-10-16
Attending: INTERNAL MEDICINE

## 2019-10-16 DIAGNOSIS — Z82.49 FAMILY HISTORY OF PREMATURE CORONARY ARTERY DISEASE: ICD-10-CM

## 2019-10-16 PROCEDURE — 75571 CT HRT W/O DYE W/CA TEST: CPT

## 2019-10-17 ENCOUNTER — TELEPHONE (OUTPATIENT)
Dept: CARDIOLOGY | Facility: CLINIC | Age: 59
End: 2019-10-17

## 2019-10-17 NOTE — TELEPHONE ENCOUNTER
Discussed with patient  CAC zero  Ok to continue RYR, will not challenge with statin at this time  Though will try zetia for some ldl lowering given elevated lp(a) as I think she will tolerate this well

## 2020-06-04 ENCOUNTER — TELEPHONE (OUTPATIENT)
Dept: SCHEDULING | Facility: CLINIC | Age: 60
End: 2020-06-04

## 2020-06-04 NOTE — TELEPHONE ENCOUNTER
Pt states she spoke with someone and her lab slips were to be emailed to her yesterday but she didn't get them.  Pt thinks it is best to reschedule her telemed appt that was Monday, since she wont have labs.  Pt states she feels too rushed to get them in time now.  Pt wants to reschedule but wanted to know if telemed was ok still with Dr Ng or if she would rather see he in person.  Pt also requests the labs be emailed to her, email on chart.  Pt can be reached at [phone].

## 2020-06-29 ENCOUNTER — TELEPHONE (OUTPATIENT)
Dept: SCHEDULING | Facility: CLINIC | Age: 60
End: 2020-06-29

## 2020-06-30 NOTE — TELEPHONE ENCOUNTER
Medical Records Request     Name of caller:  Hannahsanna Hester     Relationship to patient:  Self     Records being requested: most recent ECG faxed to Doctors office per patients request.     Dr. Campos Baugh -Injection procedure.     Fax number:  [phone].   Attn: Pita Mast contact number:  [phone]    Thank you.

## 2020-07-20 ENCOUNTER — OFFICE VISIT (OUTPATIENT)
Dept: CARDIOLOGY | Facility: CLINIC | Age: 60
End: 2020-07-20
Payer: COMMERCIAL

## 2020-07-20 VITALS
BODY MASS INDEX: 24.09 KG/M2 | HEART RATE: 75 BPM | DIASTOLIC BLOOD PRESSURE: 82 MMHG | SYSTOLIC BLOOD PRESSURE: 112 MMHG | WEIGHT: 127.6 LBS | HEIGHT: 61 IN

## 2020-07-20 DIAGNOSIS — E78.00 HYPERCHOLESTEROLEMIA: Primary | ICD-10-CM

## 2020-07-20 DIAGNOSIS — Z82.49 FAMILY HISTORY OF PREMATURE CORONARY ARTERY DISEASE: ICD-10-CM

## 2020-07-20 DIAGNOSIS — I34.1 MITRAL VALVE PROLAPSE: ICD-10-CM

## 2020-07-20 PROCEDURE — 93000 ELECTROCARDIOGRAM COMPLETE: CPT | Performed by: INTERNAL MEDICINE

## 2020-07-20 PROCEDURE — 99214 OFFICE O/P EST MOD 30 MIN: CPT | Performed by: INTERNAL MEDICINE

## 2020-07-20 RX ORDER — ROSUVASTATIN CALCIUM 10 MG/1
10 TABLET, COATED ORAL EVERY EVENING
COMMUNITY
End: 2020-08-13 | Stop reason: SDUPTHER

## 2020-07-20 RX ORDER — EZETIMIBE 10 MG/1
10 TABLET ORAL NIGHTLY
Qty: 90 TABLET | Refills: 3 | Status: SHIPPED | OUTPATIENT
Start: 2020-07-20 | End: 2021-10-08

## 2020-07-20 NOTE — PROGRESS NOTES
Interventional Cardiology           HPI     Followup  [place]  Son recently diagnosed with T1DM at age 25 but doing well. He had been losing a lot of weight.    Still riding horses, walking a lot. Has not been back to [email or web address] yet. Tennis twice a week.  Only has homes in [facility] [place] [facility] at this point. Building new house in [place].    Now on rosuvastatin 10 mg every day and tolerating. Has been on it for 3-4 months.  Lost 6 pounds  Some intermittent fasting    All recent available medical records were reviewed.    Past Medical History:   -Dyslipidemia.  Statin intolerant on crestor 20 mg and 10 mg.  Does tolerate red yeast rice 2400 mg daily.  -Baseline abnormal EKG  -Mitral valve prolapse.  Clinically silent.  -Prediabetes.  Fasting blood glucose 107.  -Had previously been on synthroid to help with weight loss. Sounds like she may have had antibodies. Endocrinologist- Mira Anguiano, [facility]. Then stopped synthroid with no change    Family History: Both grandfathers with early CAD.  Father had high cholesterol but  of MM in 40s. Mother may have had a mini stroke at age 85 also dementia. Sister with high cholesterol not on any medications.  Son diagnosed with T1DM at age 25    Social History: Lucia is  with 2 sons.  She lives in [place] half the year.  She has never been a smoker.  working from home.    Current Medications:   Aspirin 81 mg daily  Crestor 10 mg daily    Paroxetine 12.5 mg daily  Flonase  Zyrtec    Current Supplements:   Fish oil (metagenics 720 mg bid)  Systemic enzymes (wobenzyme)  DIM  (prescribed choice- 150 mg bid)  Probiotic  Vit D/K2    Allergies:  Povidone-iodine and Sulfa (sulfonamide antibiotics)    14 point review of systems is negative except as noted in HPI    Objective     Vitals:    20 1226   BP: 112/82   Pulse: 75       Wt Readings from Last 3 Encounters:   20 57.9 kg (127 lb 9.6 oz)    09/18/19 59.9 kg (132 lb)   05/20/19 60.3 kg (133 lb)       Physical Exam:  General Appearance:  Alert, no distress   Head:  Normocephalic, without obvious abnormality, atraumatic   Eyes:  Conjunctiva/corneas clear, EOM's intact   Neck: No thyroid enlargement. JVD<8. No bruits    Lungs:   Clear to auscultation bilaterally   Heart:  Regular rhythm,no murmur heard   Abdomen:   Soft, non-tender, no masses, no organomegaly   Vascular: Pulses 2+ and symmetric all extremities, no carotid bruit or jugular vein distention   Musculoskeletal:  Skin: No injury or deformity  Skin color, texture, turgor normal, no rashes or lesions, no cyanosis    Extremities: No edema   Behavior/Emotional: Appropriate, cooperative   Neurologic:                    AAOx3, grossly intact neurologically    ECG   Sinus rhythm at 62 bpm.  Nonspecific ST and T wave abnormalities that have been stable.    Labs   Lab Results   Component Value Date    WBC 3.99 08/29/2017    HGB 13.7 08/29/2017    HCT 40.3 08/29/2017     08/29/2017    CHOL 221 (H) 09/17/2019    TRIG 99 09/17/2019    HDL 71 09/17/2019    LDLCALC 129 (H) 09/17/2019    ALT 14 09/17/2019    AST 15 09/17/2019     09/17/2019    K 4.4 09/17/2019     09/17/2019    CREATININE 0.59 09/17/2019    BUN 13 09/17/2019    CO2 29 09/17/2019     Labs 7/2020.  , HDL 86, TG 60, LDL 99, nonHDL 114  Glc 92, creat 0.6, K 4.4  TSH 3.4, Hgb 13.8    Outside labs dated 9/17/2009 18 were reviewed.  Total cholesterol 221, HDL 71, triglycerides 99, , non-  Glucose 107  Creatinine 0.59  AST 15, ALT 14    Imaging    CAC 2019. o  Coronary calcium score June 2016.  Composite score of 0.  Noncardiac portions were normal.    Assessment/Plan     Dyslipidemia with family history of CAD.    Also with elevated LP(a).  She is asymptomatic with a coronary calcium score of 0 in 2016 and still 0 in 2019.    Previously statin intolerant (crestor 20, still did not tolerate when reduced to  10)  Recently decided to try again and has been tolerating crestor 10 for past 3 months  Currently- , HDL 86, TG 60, LDL 99, nonHDL 114  I think this is reasonable for now given CAC 0  If we need to add anything in future would be zetia, do not recommend increase statin due to history of intolerance.   She has improved metabolic parameters as well (Glc 92)    Abnormal EKG   With nonspecific T wave changes which has been stable.    History of mitral valve prolapse.    Previously with palpitations but currently asymptomatic.  No murmur on exam. Has had echos in past but not available to me.    Prediabetes with elevated fasting blood glucose.    Metabolically improved      Odilia Ng MD  7/20/2020       This was a 25-minute visit of which more than 50% of time was spent on face-to-face counseling regarding diet and lifestyle changes to address metabolic syndrome and reduce cardiovascular risk.

## 2020-07-20 NOTE — LETTER
2020     Gilbert Hernandez, DO  [address]  [facility] [address]    Patient: Hannah Hester  YOB: 1960  Date of Visit: 2020      Dear Dr. Hernandez:    Thank you for referring Hannah Hester to me for evaluation. Below are my notes for this consultation.    If you have questions, please do not hesitate to call me. I look forward to following your patient along with you.         Sincerely,        Odilia gN MD        CC: No Recipients  Odilia Ng MD  2020  7:22 PM  Signed       Interventional Cardiology           HPI     Followup  Has [place] most of the [place]  Son recently diagnosed with T1DM at age 25 but doing well. He had been losing a lot of weight.    Still riding horses, walking a lot. Has not been back to [employer] yet. Tennis twice a week.  Only has homes in [facility] at this point. Building new house in [place].    Now on rosuvastatin 10 mg every day and tolerating. Has been on it for 3-4 months.  Lost 6 pounds  Some intermittent fasting    All recent available medical records were reviewed.    Past Medical History:   -Dyslipidemia.  Statin intolerant on crestor 20 mg and 10 mg.  Does tolerate red yeast rice 2400 mg daily.  -Baseline abnormal EKG  -Mitral valve prolapse.  Clinically silent.  -Prediabetes.  Fasting blood glucose 107.  -Had previously been on synthroid to help with weight loss. Sounds like she may have had antibodies. End[facility]. Then stopped synthroid with no change    Family History: Both grandfathers with early CAD.  Father had high cholesterol but  of MM in 40s. Mother may have had a mini stroke at age 85 also dementia. Sister with high cholesterol not on any medications.  Son diagnosed with T1DM at age 25    Social History: Lucia is  with 2 sons.  She lives in [place] half the year.  She has never been a smoker.  working from  home.    Current Medications:   Aspirin 81 mg daily  Crestor 10 mg daily    Paroxetine 12.5 mg daily  Flonase  Zyrtec    Current Supplements:   Fish oil (metagenics 720 mg bid)  Systemic enzymes (wobenzyme)  DIM  (prescribed choice- 150 mg bid)  Probiotic  Vit D/K2    Allergies:  Povidone-iodine and Sulfa (sulfonamide antibiotics)    14 point review of systems is negative except as noted in HPI    Objective     Vitals:    07/20/20 1226   BP: 112/82   Pulse: 75       Wt Readings from Last 3 Encounters:   07/20/20 57.9 kg (127 lb 9.6 oz)   09/18/19 59.9 kg (132 lb)   05/20/19 60.3 kg (133 lb)       Physical Exam:  General Appearance:  Alert, no distress   Head:  Normocephalic, without obvious abnormality, atraumatic   Eyes:  Conjunctiva/corneas clear, EOM's intact   Neck: No thyroid enlargement. JVD<8. No bruits    Lungs:   Clear to auscultation bilaterally   Heart:  Regular rhythm,no murmur heard   Abdomen:   Soft, non-tender, no masses, no organomegaly   Vascular: Pulses 2+ and symmetric all extremities, no carotid bruit or jugular vein distention   Musculoskeletal:  Skin: No injury or deformity  Skin color, texture, turgor normal, no rashes or lesions, no cyanosis    Extremities: No edema   Behavior/Emotional: Appropriate, cooperative   Neurologic:                    AAOx3, grossly intact neurologically    ECG   Sinus rhythm at 62 bpm.  Nonspecific ST and T wave abnormalities that have been stable.    Labs   Lab Results   Component Value Date    WBC 3.99 08/29/2017    HGB 13.7 08/29/2017    HCT 40.3 08/29/2017     08/29/2017    CHOL 221 (H) 09/17/2019    TRIG 99 09/17/2019    HDL 71 09/17/2019    LDLCALC 129 (H) 09/17/2019    ALT 14 09/17/2019    AST 15 09/17/2019     09/17/2019    K 4.4 09/17/2019     09/17/2019    CREATININE 0.59 09/17/2019    BUN 13 09/17/2019    CO2 29 09/17/2019     Labs 7/2020.  , HDL 86, TG 60, LDL 99, nonHDL 114  Glc 92, creat 0.6, K 4.4  TSH 3.4, Hgb  13.8    Outside labs dated 9/17/2009 18 were reviewed.  Total cholesterol 221, HDL 71, triglycerides 99, , non-  Glucose 107  Creatinine 0.59  AST 15, ALT 14    Imaging    CAC 2019. o  Coronary calcium score June 2016.  Composite score of 0.  Noncardiac portions were normal.    Assessment/Plan     Dyslipidemia with family history of CAD.    Also with elevated LP(a).  She is asymptomatic with a coronary calcium score of 0 in 2016 and still 0 in 2019.    Previously statin intolerant (crestor 20, still did not tolerate when reduced to 10)  Recently decided to try again and has been tolerating crestor 10 for past 3 months  Currently- , HDL 86, TG 60, LDL 99, nonHDL 114  I think this is reasonable for now given CAC 0  If we need to add anything in future would be zetia, do not recommend increase statin due to history of intolerance.   She has improved metabolic parameters as well (Glc 92)    Abnormal EKG   With nonspecific T wave changes which has been stable.    History of mitral valve prolapse.    Previously with palpitations but currently asymptomatic.  No murmur on exam. Has had echos in past but not available to me.    Prediabetes with elevated fasting blood glucose.    Metabolically improved      Odilia Ng MD  7/20/2020       This was a 25-minute visit of which more than 50% of time was spent on face-to-face counseling regarding diet and lifestyle changes to address metabolic syndrome and reduce cardiovascular risk.

## 2020-08-13 RX ORDER — ROSUVASTATIN CALCIUM 10 MG/1
10 TABLET, COATED ORAL EVERY EVENING
Qty: 90 TABLET | Refills: 3 | Status: SHIPPED | OUTPATIENT
Start: 2020-08-13 | End: 2021-08-27 | Stop reason: SDUPTHER

## 2020-11-21 NOTE — HPI: COSMETIC (BOTOX)
Have You Had Botox Before?: has had botox Pt presents to ED for covid test. Pt state "I am traveling to [place] on Tuesday". Pt denies CP/sob/n/v/d/f/chills/cough/sick contacts at this time.

## 2021-02-01 ENCOUNTER — TELEPHONE (OUTPATIENT)
Dept: SCHEDULING | Facility: CLINIC | Age: 61
End: 2021-02-01

## 2021-02-01 NOTE — TELEPHONE ENCOUNTER
Pt request EKG done last sent to    Dr. Rojelio Salter/ [facility]    P# [phone]   F# [phone]

## 2021-06-01 ENCOUNTER — TELEPHONE (OUTPATIENT)
Dept: CARDIOLOGY | Facility: CLINIC | Age: 61
End: 2021-06-01

## 2021-06-01 NOTE — TELEPHONE ENCOUNTER
Message left for Hannah Hester to cancel 7.19.2021 in office visit. Asked Hannah Hester to please call office to confirm. Please reschedule upon return call.

## 2021-07-09 ENCOUNTER — TELEPHONE (OUTPATIENT)
Dept: SCHEDULING | Facility: CLINIC | Age: 61
End: 2021-07-09

## 2021-07-09 NOTE — TELEPHONE ENCOUNTER
Pt requesting her labs be mailed to her [place] residence.  Please mail slips from 7/2020 to:            [address] [place] [address]

## 2021-07-26 LAB
25(OH)D3 SERPL-MCNC: 42 NG/ML (ref 30–100)
ALBUMIN SERPL-MCNC: 4.3 G/DL (ref 3.6–5.1)
ALBUMIN/GLOB SERPL: 2.4 (CALC) (ref 1–2.5)
ALP SERPL-CCNC: 59 U/L (ref 37–153)
ALT SERPL-CCNC: 18 U/L (ref 6–29)
APO B SERPL-MCNC: 84 MG/DL
AST SERPL-CCNC: 18 U/L (ref 10–35)
BILIRUB SERPL-MCNC: 0.3 MG/DL (ref 0.2–1.2)
BUN SERPL-MCNC: 19 MG/DL (ref 7–25)
BUN/CREAT SERPL: ABNORMAL (CALC) (ref 6–22)
CALCIUM SERPL-MCNC: 9.1 MG/DL (ref 8.6–10.4)
CHLORIDE SERPL-SCNC: 107 MMOL/L (ref 98–110)
CHOLEST SERPL-MCNC: 206 MG/DL
CHOLEST/HDLC SERPL: 2.2 (CALC)
CO2 SERPL-SCNC: 22 MMOL/L (ref 20–32)
CREAT SERPL-MCNC: 0.55 MG/DL (ref 0.5–0.99)
GLOBULIN SER CALC-MCNC: 1.8 G/DL (CALC) (ref 1.9–3.7)
GLUCOSE SERPL-MCNC: 104 MG/DL (ref 65–99)
HBA1C MFR BLD: 5.1 % OF TOTAL HGB
HDLC SERPL-MCNC: 95 MG/DL
LDLC SERPL CALC-MCNC: 97 MG/DL (CALC)
NONHDLC SERPL-MCNC: 111 MG/DL (CALC)
POTASSIUM SERPL-SCNC: 4.4 MMOL/L (ref 3.5–5.3)
PROT SERPL-MCNC: 6.1 G/DL (ref 6.1–8.1)
QUEST EGFR AFRICAN AMERICAN: 117 ML/MIN/1.73M2
QUEST EGFR NON-AFR. AMERICAN: 101 ML/MIN/1.73M2
SODIUM SERPL-SCNC: 144 MMOL/L (ref 135–146)
TRIGL SERPL-MCNC: 57 MG/DL
TSH SERPL-ACNC: 2.29 MIU/L (ref 0.4–4.5)

## 2021-08-27 ENCOUNTER — TELEPHONE (OUTPATIENT)
Dept: SCHEDULING | Facility: CLINIC | Age: 61
End: 2021-08-27

## 2021-08-27 RX ORDER — ROSUVASTATIN CALCIUM 10 MG/1
10 TABLET, COATED ORAL EVERY EVENING
Qty: 90 TABLET | Refills: 3 | Status: SHIPPED | OUTPATIENT
Start: 2021-08-27 | End: 2022-10-04

## 2021-08-27 NOTE — TELEPHONE ENCOUNTER
Medicine Refill Request    Last Office Visit: Visit date not found  Last Telemedicine Visit: Visit date not found    rosuvastatin (CRESTOR) 10 mg tablet          Current Outpatient Medications:   •  aspirin (ASPIR-81) 81 mg enteric coated tablet, Take 1 tablet (81 mg total) by mouth daily., Disp: 90 tablet, Rfl: 1  •  azelastine (ASTELIN) 137 mcg (0.1 %) nasal spray, Administer 2 sprays into each nostril 2 (two) times a day., Disp: , Rfl: 11  •  cetirizine HCl (ZYRTEC ORAL), Take by mouth. 1/2 tablet in the evening , Disp: , Rfl:   •  cetirizine HCl/pseudoephedrine (ZYRTEC-D ORAL), Take by mouth. 1/2 tablet in the a.m., Disp: , Rfl:   •  cholecalciferol, vitamin D3, (VITAMIN D3) 5,000 unit tablet, Take 5,000 Units by mouth daily., Disp: , Rfl:   •  coenzyme Q10 (CO Q-10) 200 mg capsule, Take 200 mg by mouth daily., Disp: , Rfl:   •  ezetimibe (ZETIA) 10 mg tablet, Take 1 tablet (10 mg total) by mouth nightly., Disp: 90 tablet, Rfl: 3  •  fluticasone prp-sod.chl,bicarb 50 mcg- 0.9 % kit,spray suspension and spray, Administer 1 spray into affected nostril(s) 2 (two) times a day., Disp: , Rfl:   •  MAGNESIUM ORAL, Take 150 mg by mouth nightly.  , Disp: , Rfl:   •  omega 3-dha-epa-fish oil (FISH OIL) 1,000 mg (120 mg-180 mg) capsule, Take 1 tablet by mouth 2 (two) times a day., Disp: , Rfl:   •  PARoxetine CR (PAXIL-CR) 12.5 mg 24 hr tablet, Take 0.5 tablets by mouth daily., Disp: , Rfl: 1  •  risedronate (ACTONEL) 150 mg tablet, Take 1 tablet by mouth every 30 (thirty) days., Disp: , Rfl: 3  •  rosuvastatin (CRESTOR) 10 mg tablet, Take 1 tablet (10 mg total) by mouth every evening., Disp: 90 tablet, Rfl: 3  •  VITAMIN D3-VITAMIN K2, MK4, ORAL, Take 1 tablet by mouth daily., Disp: , Rfl:       BP Readings from Last 3 Encounters:   07/20/20 112/82   09/18/19 110/80   05/20/19 100/68       Recent Lab results:  Lab Results   Component Value Date    CHOL 206 (H) 07/22/2021   ,   Lab Results   Component Value Date    HDL 95  07/22/2021   ,   Lab Results   Component Value Date    LDLCALC 97 07/22/2021   ,   Lab Results   Component Value Date    TRIG 57 07/22/2021        Lab Results   Component Value Date    GLUCOSE 104 (H) 07/22/2021   ,   Lab Results   Component Value Date    HGBA1C 5.1 07/22/2021         Lab Results   Component Value Date    CREATININE 0.55 07/22/2021       Lab Results   Component Value Date    TSH 2.29 07/22/2021

## 2021-10-06 ENCOUNTER — TELEPHONE (OUTPATIENT)
Dept: CARDIOLOGY | Facility: CLINIC | Age: 61
End: 2021-10-06

## 2021-10-06 NOTE — TELEPHONE ENCOUNTER
I spoke with Hannah Hester to change the time of her 10.08.2021 office visit. Hannah Hester is leaving for [place] on 10.09.2021 and is unable to change the time. Her original appointment on 7.19.2021 was rescheduled by us due to hospital coverage, she then had to cancel the 8.30.2021 visit.   She states she had testing in [place] and needs to review it with Dr Ng. She asks that she has a telemedicine visit.     Please advise.  Thank you

## 2021-10-08 ENCOUNTER — OFFICE VISIT (OUTPATIENT)
Dept: CARDIOLOGY | Facility: CLINIC | Age: 61
End: 2021-10-08
Payer: COMMERCIAL

## 2021-10-08 VITALS
RESPIRATION RATE: 17 BRPM | HEIGHT: 61 IN | HEART RATE: 70 BPM | DIASTOLIC BLOOD PRESSURE: 78 MMHG | WEIGHT: 129.9 LBS | SYSTOLIC BLOOD PRESSURE: 126 MMHG | BODY MASS INDEX: 24.52 KG/M2 | OXYGEN SATURATION: 97 %

## 2021-10-08 DIAGNOSIS — E78.00 HYPERCHOLESTEROLEMIA: Primary | ICD-10-CM

## 2021-10-08 PROCEDURE — 99214 OFFICE O/P EST MOD 30 MIN: CPT | Performed by: INTERNAL MEDICINE

## 2021-10-08 PROCEDURE — 3008F BODY MASS INDEX DOCD: CPT | Performed by: INTERNAL MEDICINE

## 2021-10-08 PROCEDURE — 93000 ELECTROCARDIOGRAM COMPLETE: CPT | Performed by: INTERNAL MEDICINE

## 2021-10-08 RX ORDER — VALACYCLOVIR HYDROCHLORIDE 500 MG/1
500 TABLET, FILM COATED ORAL DAILY
COMMUNITY

## 2021-10-08 NOTE — PROGRESS NOTES
Interventional Cardiology           HPI      Last visit 2020    Leaving for [place] 10/9/2021 for the winter. Her home is in [place].    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    Past Medical History:   -Dyslipidemia.  Statin intolerant on crestor 20 mg.  Does tolerate red yeast rice 2400 mg daily.  -Baseline abnormal EKG  -Mitral valve prolapse.  Clinically silent.  -Prediabetes.  Fasting blood glucose 107.  -Had previously been on synthroid to help with weight loss. Sounds like she may have had antibodies. Endocrinologist- Mira Anguiano, [facility]. Then stopped synthroid with no change    Family History: Both grandfathers with early CAD.  Father had high cholesterol but  of MM in 40s. Mother may have had a mini stroke at age 85 also dementia. Sister with high cholesterol not on any medications.  Son diagnosed with T1DM at age 25    Social History: Lucia is  with 2 sons.  She lives in [place] half the year.  She has never been a smoker.  working from home. Her one son has Type 1 diabetes. Loves tennis and riding horses.    Current Medications:     Current Outpatient Medications:   •  aspirin (ASPIR-81) 81 mg enteric coated tablet, Take 1 tablet (81 mg total) by mouth daily., Disp: 90 tablet, Rfl: 1  •  azelastine (ASTELIN) 137 mcg (0.1 %) nasal spray, Administer 2 sprays into each nostril 2 (two) times a day., Disp: , Rfl: 11  •  cetirizine HCl (ZYRTEC ORAL),  Take by mouth. 1/2 tablet in the evening , Disp: , Rfl:   •  cetirizine HCl/pseudoephedrine (ZYRTEC-D ORAL), Take by mouth. 1/2 tablet in the a.m., Disp: , Rfl:   •  cholecalciferol, vitamin D3, (VITAMIN D3) 5,000 unit tablet, Take 5,000 Units by mouth daily., Disp: , Rfl:   •  coenzyme Q10 (CO Q-10) 200 mg capsule, Take 200 mg by mouth daily., Disp: , Rfl:   •  fluticasone prp-sod.chl,bicarb 50 mcg- 0.9 % kit,spray suspension and spray, Administer 1 spray into affected nostril(s) 2 (two) times a day., Disp: , Rfl:   •  MAGNESIUM ORAL, Take 150 mg by mouth nightly.  , Disp: , Rfl:   •  omega 3-dha-epa-fish oil (FISH OIL) 1,000 mg (120 mg-180 mg) capsule, Take 1 tablet by mouth 2 (two) times a day., Disp: , Rfl:   •  PARoxetine CR (PAXIL-CR) 12.5 mg 24 hr tablet, Take 0.5 tablets by mouth daily., Disp: , Rfl: 1  •  rosuvastatin (CRESTOR) 10 mg tablet, Take 1 tablet (10 mg total) by mouth every evening., Disp: 90 tablet, Rfl: 3  •  valACYclovir (VALTREX) 500 mg tablet, Take 500 mg by mouth 2 (two) times a day., Disp: , Rfl:   •  VITAMIN D3-VITAMIN K2, MK4, ORAL, Take 1 tablet by mouth daily., Disp: , Rfl:     Current Supplements:   Fish oil ReQ 1250 mg bid  Systemic enzymes (wobenzyme)  DIM  (prescribed choice- 150 mg bid)  Probiotic  Vit D/K2    Allergies:  Povidone-iodine and Sulfa (sulfonamide antibiotics)    14 point review of systems is negative except as noted in HPI    Objective     Vitals:    10/08/21 1157   BP: 126/78   Pulse: 70   Resp: 17   SpO2: 97%       Wt Readings from Last 3 Encounters:   10/08/21 58.9 kg (129 lb 14.4 oz)   07/20/20 57.9 kg (127 lb 9.6 oz)   09/18/19 59.9 kg (132 lb)       Physical Exam:  General Appearance:  Alert, no distress   Head:  Normocephalic, without obvious abnormality, atraumatic   Eyes:  Conjunctiva/corneas clear, EOM's intact   Neck: No thyroid enlargement. JVD<8. No bruits    Lungs:   Clear to auscultation bilaterally   Heart:  Regular rhythm,no murmur heard   Abdomen:    Soft, non-tender, no masses, no organomegaly   Vascular: Pulses 2+ and symmetric all extremities, no carotid bruit or jugular vein distention   Musculoskeletal:  Skin: No injury or deformity  Skin color, texture, turgor normal, no rashes or lesions, no cyanosis    Extremities: No edema   Behavior/Emotional: Appropriate, cooperative   Neurologic:                    AAOx3, grossly intact neurologically    ECG   Sinus rhythm at 62 bpm.  Nonspecific ST and T wave abnormalities that have been stable.    Labs   Lab Results   Component Value Date    WBC 3.99 08/29/2017    HGB 13.7 08/29/2017    HCT 40.3 08/29/2017     08/29/2017    CHOL 206 (H) 07/22/2021    TRIG 57 07/22/2021    HDL 95 07/22/2021    LDLCALC 97 07/22/2021    ALT 18 07/22/2021    AST 18 07/22/2021     07/22/2021    K 4.4 07/22/2021     07/22/2021    CREATININE 0.55 07/22/2021    BUN 19 07/22/2021    CO2 22 07/22/2021    TSH 2.29 07/22/2021    HGBA1C 5.1 07/22/2021     Labs 7/22/2021:  Cholesterol 206, triglycerides 57, HDL 95, LDL 97, non-  Apo B 84  Vitamin D 42  TSH 2.29  Na 144, K 4.4, Cl 107, CO2 22, BUN 19, creatinine 0.55, glucose 104, Hgb A1c 5.1, ALT 18, AST 18    Labs 7/2020.  , HDL 86, TG 60, LDL 99, nonHDL 114  Glc 92, creat 0.6, K 4.4  TSH 3.4, Hgb 13.8    Outside labs dated 9/17/2009 18 were reviewed.  Total cholesterol 221, HDL 71, triglycerides 99, , non-  Glucose 107  Creatinine 0.59  AST 15, ALT 14    Imaging    Stress echocardiogram 4/27/2021:  - Exam indication: Abnormal ECG   - The exercise stress echo was negative for ischemia at 91 % of MPHR and good   workload achieved (9.3 METS).   - The left ventricle is normal in size. Left ventricular systolic function is   normal. EF = 56 ± 5% (2D biplane) Normal left ventricular diastolic function.   - The right ventricle is normal in size. Right ventricular systolic function is   normal.   - There are no significant valvular abnormalities.   - The  patient has not had a prior CC echocardiographic exam for comparison    CAC 2019. 0  Coronary calcium score June 2016.  Composite score of 0.  Noncardiac portions were normal.    Assessment/Plan     Dyslipidemia with family history of CAD, Lp(a) 143 nmol/L.    She is asymptomatic with a coronary calcium score of 0 in 2016 and still 0 in 2019.    Previously statin intolerant (crestor 20). Ok on crestor 10 though does feel the need to hold it for a few weeks every once in a while for neck/shoulder spasm. I suggested perhaps trying to take 10 mg 5 days a week and see if she doesn't have to hold it anymore. Alternatively, can reduce and add zetia.  LDL-C goal<100 reasonable for now given CAC 0  She has improved metabolic parameters as well (Glc 92)    Abnormal EKG   With nonspecific T wave changes which has been stable.    History of mitral valve prolapse.    Previously with palpitations but currently asymptomatic.  No murmur on exam. Has had echos in past but not available to me.    Prediabetes with elevated fasting blood glucose.    Metabolically improved  Discussed being extra cautious while on prolonged valtrex      10/8/2021       I spent 35 minutes on this date of service performing the following activities: obtaining history, performing examination, entering orders, documenting, preparing for visit, obtaining / reviewing records, providing counseling and education including regarding diet and lifestyle changes to address cardiovascular risk, independently reviewing study/studies, communicating results, and coordinating care

## 2021-10-08 NOTE — LETTER
2021     Gilbert Hernandez, DO  [address]  [place] [place] [address]  [facility] [address]    Patient: Hannah eHster  YOB: 1960  Date of Visit: 10/8/2021      Dear Dr. Hernandez:    Thank you for referring Hannah Hester to me for evaluation. Below are my notes for this consultation.    If you have questions, please do not hesitate to call me. I look forward to following your patient along with you.         Sincerely,        Odilia Ng MD        CC: No Recipients  Odilia Ng MD  10/8/2021 12:57 PM  Signed       Interventional Cardiology           HPI      Last visit 2020    Leaving for [place] 10/9/2021 for the winter. Her home is in [place].    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    Past Medical History:   -Dyslipidemia.  Statin intolerant on crestor 20 mg.  Does tolerate red yeast rice 2400 mg daily.  -Baseline abnormal EKG  -Mitral valve prolapse.  Clinically silent.  -Prediabetes.  Fasting blood glucose 107.  -Had previously been on synthroid to help with weight loss. Sounds like she may have had antibodies. [facility] Then stopped synthroid with no change    Family History: Both grandfathers with early CAD.  Father had high cholesterol but  of MM in 40s. Mother may have had a mini stroke at age 85 also dementia. Sister with high cholesterol not on any medications.  Son diagnosed with T1DM at age 25    Social  History: Lucia is  with 2 sons.  She lives in [place] half the year.  She has never been a smoker.  working from home. Her one son has Type 1 diabetes. Loves tennis and riding horses.    Current Medications:     Current Outpatient Medications:   •  aspirin (ASPIR-81) 81 mg enteric coated tablet, Take 1 tablet (81 mg total) by mouth daily., Disp: 90 tablet, Rfl: 1  •  azelastine (ASTELIN) 137 mcg (0.1 %) nasal spray, Administer 2 sprays into each nostril 2 (two) times a day., Disp: , Rfl: 11  •  cetirizine HCl (ZYRTEC ORAL), Take by mouth. 1/2 tablet in the evening , Disp: , Rfl:   •  cetirizine HCl/pseudoephedrine (ZYRTEC-D ORAL), Take by mouth. 1/2 tablet in the a.m., Disp: , Rfl:   •  cholecalciferol, vitamin D3, (VITAMIN D3) 5,000 unit tablet, Take 5,000 Units by mouth daily., Disp: , Rfl:   •  coenzyme Q10 (CO Q-10) 200 mg capsule, Take 200 mg by mouth daily., Disp: , Rfl:   •  fluticasone prp-sod.chl,bicarb 50 mcg- 0.9 % kit,spray suspension and spray, Administer 1 spray into affected nostril(s) 2 (two) times a day., Disp: , Rfl:   •  MAGNESIUM ORAL, Take 150 mg by mouth nightly.  , Disp: , Rfl:   •  omega 3-dha-epa-fish oil (FISH OIL) 1,000 mg (120 mg-180 mg) capsule, Take 1 tablet by mouth 2 (two) times a day., Disp: , Rfl:   •  PARoxetine CR (PAXIL-CR) 12.5 mg 24 hr tablet, Take 0.5 tablets by mouth daily., Disp: , Rfl: 1  •  rosuvastatin (CRESTOR) 10 mg tablet, Take 1 tablet (10 mg total) by mouth every evening., Disp: 90 tablet, Rfl: 3  •  valACYclovir (VALTREX) 500 mg tablet, Take 500 mg by mouth 2 (two) times a day., Disp: , Rfl:   •  VITAMIN D3-VITAMIN K2, MK4, ORAL, Take 1 tablet by mouth daily., Disp: , Rfl:     Current Supplements:   Fish oil ReQ 1250 mg bid  Systemic enzymes (wobenzyme)  DIM  (prescribed choice- 150 mg bid)  Probiotic  Vit D/K2    Allergies:  Povidone-iodine and Sulfa (sulfonamide antibiotics)    14 point review of systems is negative except as noted in  HPI    Objective     Vitals:    10/08/21 1157   BP: 126/78   Pulse: 70   Resp: 17   SpO2: 97%       Wt Readings from Last 3 Encounters:   10/08/21 58.9 kg (129 lb 14.4 oz)   07/20/20 57.9 kg (127 lb 9.6 oz)   09/18/19 59.9 kg (132 lb)       Physical Exam:  General Appearance:  Alert, no distress   Head:  Normocephalic, without obvious abnormality, atraumatic   Eyes:  Conjunctiva/corneas clear, EOM's intact   Neck: No thyroid enlargement. JVD<8. No bruits    Lungs:   Clear to auscultation bilaterally   Heart:  Regular rhythm,no murmur heard   Abdomen:   Soft, non-tender, no masses, no organomegaly   Vascular: Pulses 2+ and symmetric all extremities, no carotid bruit or jugular vein distention   Musculoskeletal:  Skin: No injury or deformity  Skin color, texture, turgor normal, no rashes or lesions, no cyanosis    Extremities: No edema   Behavior/Emotional: Appropriate, cooperative   Neurologic:                    AAOx3, grossly intact neurologically    ECG   Sinus rhythm at 62 bpm.  Nonspecific ST and T wave abnormalities that have been stable.    Labs   Lab Results   Component Value Date    WBC 3.99 08/29/2017    HGB 13.7 08/29/2017    HCT 40.3 08/29/2017     08/29/2017    CHOL 206 (H) 07/22/2021    TRIG 57 07/22/2021    HDL 95 07/22/2021    LDLCALC 97 07/22/2021    ALT 18 07/22/2021    AST 18 07/22/2021     07/22/2021    K 4.4 07/22/2021     07/22/2021    CREATININE 0.55 07/22/2021    BUN 19 07/22/2021    CO2 22 07/22/2021    TSH 2.29 07/22/2021    HGBA1C 5.1 07/22/2021     Labs 7/22/2021:  Cholesterol 206, triglycerides 57, HDL 95, LDL 97, non-  Apo B 84  Vitamin D 42  TSH 2.29  Na 144, K 4.4, Cl 107, CO2 22, BUN 19, creatinine 0.55, glucose 104, Hgb A1c 5.1, ALT 18, AST 18    Labs 7/2020.  , HDL 86, TG 60, LDL 99, nonHDL 114  Glc 92, creat 0.6, K 4.4  TSH 3.4, Hgb 13.8    Outside labs dated 9/17/2009 18 were reviewed.  Total cholesterol 221, HDL 71, triglycerides 99, ,  non-  Glucose 107  Creatinine 0.59  AST 15, ALT 14    Imaging    Stress echocardiogram 4/27/2021:  - Exam indication: Abnormal ECG   - The exercise stress echo was negative for ischemia at 91 % of MPHR and good   workload achieved (9.3 METS).   - The left ventricle is normal in size. Left ventricular systolic function is   normal. EF = 56 ± 5% (2D biplane) Normal left ventricular diastolic function.   - The right ventricle is normal in size. Right ventricular systolic function is   normal.   - There are no significant valvular abnormalities.   - The patient has not had a prior CC echocardiographic exam for comparison    CAC 2019. 0  Coronary calcium score June 2016.  Composite score of 0.  Noncardiac portions were normal.    Assessment/Plan     Dyslipidemia with family history of CAD, Lp(a) 143 nmol/L.    She is asymptomatic with a coronary calcium score of 0 in 2016 and still 0 in 2019.    Previously statin intolerant (crestor 20). Ok on crestor 10 though does feel the need to hold it for a few weeks every once in a while for neck/shoulder spasm. I suggested perhaps trying to take 10 mg 5 days a week and see if she doesn't have to hold it anymore. Alternatively, can reduce and add zetia.  LDL-C goal<100 reasonable for now given CAC 0  She has improved metabolic parameters as well (Glc 92)    Abnormal EKG   With nonspecific T wave changes which has been stable.    History of mitral valve prolapse.    Previously with palpitations but currently asymptomatic.  No murmur on exam. Has had echos in past but not available to me.    Prediabetes with elevated fasting blood glucose.    Metabolically improved  Discussed being extra cautious while on prolonged valtrex      10/8/2021       I spent 35 minutes on this date of service performing the following activities: obtaining history, performing examination, entering orders, documenting, preparing for visit, obtaining / reviewing records, providing counseling and  education including regarding diet and lifestyle changes to address cardiovascular risk, independently reviewing study/studies, communicating results, and coordinating care

## 2022-10-01 PROBLEM — E78.41 ELEVATED LP(A): Status: ACTIVE | Noted: 2022-10-01

## 2023-05-09 ENCOUNTER — TELEPHONE (OUTPATIENT)
Dept: SCHEDULING | Facility: CLINIC | Age: 63
End: 2023-05-09
Payer: COMMERCIAL

## 2023-05-11 NOTE — TELEPHONE ENCOUNTER
Message left for Hannah Hester of appt date & time 6[place] 1[place] [facility] [place].  I did explain the dr's hours and asked Hannah Hester to please call to confirm or reschedule.  
Pt requesting to reschedule apt with Dr. Ng    Offered November as first available, pt states too far away    Pt [phone]  
Yes

## 2023-05-25 ENCOUNTER — TELEPHONE (OUTPATIENT)
Dept: SCHEDULING | Facility: CLINIC | Age: 63
End: 2023-05-25
Payer: COMMERCIAL

## 2023-05-25 DIAGNOSIS — E78.41 ELEVATED LP(A): ICD-10-CM

## 2023-05-25 DIAGNOSIS — R73.01 IMPAIRED FASTING GLUCOSE: Primary | ICD-10-CM

## 2023-05-25 DIAGNOSIS — E78.00 HYPERCHOLESTEROLEMIA: ICD-10-CM

## 2023-05-25 DIAGNOSIS — R94.31 ABNORMAL EKG: ICD-10-CM

## 2023-05-25 DIAGNOSIS — E55.9 VITAMIN D DEFICIENCY: ICD-10-CM

## 2023-05-25 DIAGNOSIS — Z82.49 FAMILY HISTORY OF PREMATURE CORONARY ARTERY DISEASE: ICD-10-CM

## 2023-05-25 NOTE — TELEPHONE ENCOUNTER
Pt wants to know if she needs any lab work done for upcoming apt    Pt uses [facility]    Pt [phone]

## 2023-05-25 NOTE — TELEPHONE ENCOUNTER
Called and spoke with patient.    Patient advised that lab work was ordered for her to be completed prior to her appointment.  She was advised to fast 12 to 14 hours prior to getting labs drawn.  She is requesting us to fax these over to the [facility] on [address] in [place], [place]. [phone].    She is also asking for us to email copy to her at: [email or web address].    I advised patient to reach back out to the office if she does not receive the email with the labs by tomorrow 5/26 around lunchtime.  She was advised to check her spam mail as sometimes it may go to there.  Patient verbalized understanding was appreciative of call back.

## 2023-05-30 NOTE — TELEPHONE ENCOUNTER
Pt at [facility] and lab does not have orders.     Orders changed to [facility] and FAXED to [phone] and [phone] per pt request.     Thank you.

## 2023-05-31 NOTE — TELEPHONE ENCOUNTER
Received call from patient stating she went to [facility] this morning and they only received fax order for thyroid function. Patient requesting all other lab orders be re-faxed to [facility] and herself.     Faxed requisition for the following labs:  CBC  CMP  Lipid Panel  Hemoglobin A1c  Vitamin D 25 hydroxy  Apoplipoprotein B  TSH w/ reflex FT4    Labs were faxed to [facility] in [facility] at [phone] and to patient at [phone] at her request.

## 2023-06-01 LAB — TSH SERPL-ACNC: 2.13 MIU/L (ref 0.4–4.5)

## 2023-06-05 ENCOUNTER — OFFICE VISIT (OUTPATIENT)
Dept: CARDIOLOGY | Facility: CLINIC | Age: 63
End: 2023-06-05
Payer: COMMERCIAL

## 2023-06-05 VITALS
SYSTOLIC BLOOD PRESSURE: 113 MMHG | BODY MASS INDEX: 22.08 KG/M2 | HEIGHT: 62 IN | RESPIRATION RATE: 18 BRPM | WEIGHT: 120 LBS | OXYGEN SATURATION: 98 % | HEART RATE: 74 BPM | DIASTOLIC BLOOD PRESSURE: 70 MMHG

## 2023-06-05 DIAGNOSIS — R94.31 EKG, ABNORMAL: ICD-10-CM

## 2023-06-05 DIAGNOSIS — I34.1 MITRAL VALVE PROLAPSE: ICD-10-CM

## 2023-06-05 DIAGNOSIS — E78.00 HYPERCHOLESTEROLEMIA: Primary | ICD-10-CM

## 2023-06-05 DIAGNOSIS — E78.41 ELEVATED LP(A): ICD-10-CM

## 2023-06-05 DIAGNOSIS — R73.09 ELEVATED GLUCOSE: ICD-10-CM

## 2023-06-05 LAB
25(OH)D3 SERPL-MCNC: 82 NG/ML (ref 30–100)
ALBUMIN SERPL-MCNC: 4.2 G/DL (ref 3.6–5.1)
ALBUMIN/GLOB SERPL: 2 (CALC) (ref 1–2.5)
ALP SERPL-CCNC: 53 U/L (ref 37–153)
ALT SERPL-CCNC: 19 U/L (ref 6–29)
APO B SERPL-MCNC: 76 MG/DL
AST SERPL-CCNC: 20 U/L (ref 10–35)
BILIRUB SERPL-MCNC: 0.3 MG/DL (ref 0.2–1.2)
BUN SERPL-MCNC: 13 MG/DL (ref 7–25)
BUN/CREAT SERPL: ABNORMAL (CALC) (ref 6–22)
CALCIUM SERPL-MCNC: 9.3 MG/DL (ref 8.6–10.4)
CHLORIDE SERPL-SCNC: 104 MMOL/L (ref 98–110)
CHOLEST SERPL-MCNC: 189 MG/DL
CHOLEST/HDLC SERPL: 2.2 (CALC)
CO2 SERPL-SCNC: 28 MMOL/L (ref 20–32)
CREAT SERPL-MCNC: 0.52 MG/DL (ref 0.5–1.05)
EGFRCR SERPLBLD CKD-EPI 2021: 104 ML/MIN/1.73M2
ERYTHROCYTE [DISTWIDTH] IN BLOOD BY AUTOMATED COUNT: 12.3 % (ref 11–15)
GLOBULIN SER CALC-MCNC: 2.1 G/DL (CALC) (ref 1.9–3.7)
GLUCOSE SERPL-MCNC: 102 MG/DL (ref 65–99)
HBA1C MFR BLD: 5.3 % OF TOTAL HGB
HCT VFR BLD AUTO: 42.6 % (ref 35–45)
HDLC SERPL-MCNC: 86 MG/DL
HGB BLD-MCNC: 13.8 G/DL (ref 11.7–15.5)
LDLC SERPL CALC-MCNC: 88 MG/DL (CALC)
MCH RBC QN AUTO: 31.6 PG (ref 27–33)
MCHC RBC AUTO-ENTMCNC: 32.4 G/DL (ref 32–36)
MCV RBC AUTO: 97.5 FL (ref 80–100)
NONHDLC SERPL-MCNC: 103 MG/DL (CALC)
PLATELET # BLD AUTO: 212 THOUSAND/UL (ref 140–400)
PMV BLD REES-ECKER: 10.2 FL (ref 7.5–12.5)
POTASSIUM SERPL-SCNC: 4.4 MMOL/L (ref 3.5–5.3)
PROT SERPL-MCNC: 6.3 G/DL (ref 6.1–8.1)
RBC # BLD AUTO: 4.37 MILLION/UL (ref 3.8–5.1)
SODIUM SERPL-SCNC: 140 MMOL/L (ref 135–146)
TRIGL SERPL-MCNC: 65 MG/DL
WBC # BLD AUTO: 3.4 THOUSAND/UL (ref 3.8–10.8)

## 2023-06-05 PROCEDURE — 93000 ELECTROCARDIOGRAM COMPLETE: CPT | Performed by: INTERNAL MEDICINE

## 2023-06-05 PROCEDURE — 99214 OFFICE O/P EST MOD 30 MIN: CPT | Performed by: INTERNAL MEDICINE

## 2023-06-05 PROCEDURE — 3008F BODY MASS INDEX DOCD: CPT | Performed by: INTERNAL MEDICINE

## 2023-06-05 RX ORDER — NAPROXEN SODIUM 220 MG/1
81 TABLET, FILM COATED ORAL DAILY
COMMUNITY

## 2023-06-05 NOTE — PROGRESS NOTES
Interventional Cardiology           HPI    Last office visit 10/8/2021  - seen by cardiologist in [place] and [place]- hD STRESS TEST    Se[facility] at [facility] in [place] for left knee pain 2/2022 after knee injury with small skin.  Had arthrocentesis    Occasional palp - skipped beat  Occasional dizziness with lying in bed and turning head side to side and sometimes with getting up from Pilates - resolves in seconds  Uses CPAP nightly  Occasional blood on tissue from hemorrhoids  Muscle pains from activies - occasional pain in back of right shoulder from Crestor - holds it for a week and then able to resume, less of an issue in past year, getting more massage    No chest pain, leg swelling, orthopnea, PND, fainting, black tarry stools, blood in urine,   Weight decreased 9.9 lbs since her last visit - weight had actually increased to 132 when she went on OP[employer]ia (64 oz of water, muscle cramping- cut out dairy and junk  Lean and green meals and Optiva bars  Cut out red wine - using tequila and lime juice  Exercise - Pilates 2X/wk, tennis 5X/week, Pickleball 2X/week and riding for  minutes at least 3-4 times a week.    All recent available medical records were reviewed.    Past Medical History:   -Dyslipidemia.  Statin intolerant on crestor 20 mg.  Does tolerate red yeast rice 2400 mg daily.  -Baseline abnormal EKG  -Mitral valve prolapse.  Clinically silent.  -Prediabetes.  Fasting blood glucose 107.  -Had previously been on synthroid to help with weight loss. Sounds like she may have had antibodies. [facility]in[facility]. Then stopped synthroid with no change    ______________________________________________________  Assessment/Plan     Dyslipidemia with family history of CAD, Lp(a) 143 nmol/L.    She is asymptomatic with a coronary calcium score of 0 in 2016 and still 0 in 2019.    Previously statin intolerant (crestor 20). Ok on crestor 10 though does feel the  need to hold it for a few weeks every once in a while for neck/shoulder spasm though she is starting to think this is not related. LDL-C goal<100 reasonable for now given CAC 0  She has improved metabolic parameters as well (Glc 92)    Abnormal EKG   With nonspecific T wave changes which has been stable.    History of mitral valve prolapse.    Previously with palpitations but currently asymptomatic.  No murmur on exam. She had a treadmill stress test with her cardiologist in [place] and was told it was normal, we will try to get results  She is extremely active with no symptoms at all  ______________________________________________________    Family History: Both grandfathers with early CAD.  Father had high cholesterol but  of MM in 40s. Mother may have had a mini stroke at age 85 also dementia. Sister with high cholesterol not on any medications.  Son diagnosed with T1DM at age 25    Social History: Lucia is  with 2 sons.  She lives in [place] half the year.  She has never been a smoker.  working from home. Her one son has Type 1 diabetes. Loves tennis and riding horses.    Current Medications:     Current Outpatient Medications:   •  aspirin 81 mg chewable tablet, Take 81 mg by mouth daily., Disp: , Rfl:   •  azelastine (ASTELIN) 137 mcg (0.1 %) nasal spray, Administer 2 sprays into each nostril 2 (two) times a day., Disp: , Rfl: 11  •  cetirizine HCl (ZYRTEC ORAL), Take by mouth. 1/2 tablet in the evening , Disp: , Rfl:   •  cetirizine HCl/pseudoephedrine (ZYRTEC-D ORAL), Take by mouth. 1/2 tablet in the a.m., Disp: , Rfl:   •  cholecalciferol, vitamin D3, (VITAMIN D3) 5,000 unit tablet, Take 5,000 Units by mouth daily., Disp: , Rfl:   •  coenzyme Q10 (CO Q-10) 200 mg capsule, Take 200 mg by mouth daily., Disp: , Rfl:   •  fluticasone prp-sod.chl,bicarb 50 mcg- 0.9 % kit,spray suspension and spray, Administer 1 spray into affected nostril(s) 2 (two) times a day., Disp: , Rfl:   •  MAGNESIUM  ORAL, Take 150 mg by mouth nightly.  , Disp: , Rfl:   •  omega 3-dha-epa-fish oil (FISH OIL) 1,000 mg (120 mg-180 mg) capsule, Take 1 tablet by mouth 2 (two) times a day., Disp: , Rfl:   •  PARoxetine CR (PAXIL-CR) 12.5 mg 24 hr tablet, Take 0.5 tablets by mouth daily., Disp: , Rfl: 1  •  rosuvastatin (CRESTOR) 10 mg tablet, TAKE 1 TABLET BY MOUTH EVERY DAY IN THE EVENING, Disp: 90 tablet, Rfl: 3  •  valACYclovir (VALTREX) 500 mg tablet, Take 500 mg by mouth daily., Disp: , Rfl:   •  VITAMIN D3-VITAMIN K2, MK4, ORAL, Take 1 tablet by mouth daily., Disp: , Rfl:     Current Supplements:   Fish oil ReQ 1250 mg bid  Systemic enzymes (wobenzyme)  DIM  (prescribed choice- 150 mg bid)  Probiotic  Vit D/K2    Allergies:  Povidone-iodine and Sulfa (sulfonamide antibiotics)    14 point review of systems is negative except as noted in HPI    Objective     Vitals:    06/05/23 1353   BP: 113/70   Pulse: 74   Resp: 18   SpO2: 98%       Wt Readings from Last 3 Encounters:   06/05/23 54.4 kg (120 lb)   10/08/21 58.9 kg (129 lb 14.4 oz)   07/20/20 57.9 kg (127 lb 9.6 oz)       Physical Exam:  General Appearance:  Alert, no distress   Head:  Normocephalic, without obvious abnormality, atraumatic   Eyes:  Conjunctiva/corneas clear, EOM's intact   Neck: No thyroid enlargement. JVD<8. No bruits    Lungs:   Clear to auscultation bilaterally   Heart:  Regular rhythm,no murmur heard   Abdomen:   Soft, non-tender, no masses, no organomegaly   Vascular: Pulses 2+ and symmetric all extremities, no carotid bruit or jugular vein distention   Musculoskeletal:  Skin: No injury or deformity  Skin color, texture, turgor normal, no rashes or lesions, no cyanosis    Extremities: No edema   Behavior/Emotional: Appropriate, cooperative   Neurologic:                    AAOx3, grossly intact neurologically    ECG   Sinus rhythm at 62 bpm.  Nonspecific ST and T wave abnormalities that have been stable.    Labs   Lab Results   Component Value Date    WBC  3.4 (L) 06/01/2023    HGB 13.8 06/01/2023    HCT 42.6 06/01/2023     06/01/2023    CHOL 189 06/01/2023    TRIG 65 06/01/2023    HDL 86 06/01/2023    LDLCALC 88 06/01/2023    ALT 19 06/01/2023    AST 20 06/01/2023     06/01/2023    K 4.4 06/01/2023     06/01/2023    CREATININE 0.52 06/01/2023    BUN 13 06/01/2023    CO2 28 06/01/2023    TSH 2.13 05/31/2023    HGBA1C 5.3 06/01/2023     Labs 7/22/2021:  Cholesterol 206, triglycerides 57, HDL 95, LDL 97, non-  Apo B 84  Vitamin D 42  TSH 2.29  Na 144, K 4.4, Cl 107, CO2 22, BUN 19, creatinine 0.55, glucose 104, Hgb A1c 5.1, ALT 18, AST 18    Labs 7/2020.  , HDL 86, TG 60, LDL 99, nonHDL 114  Glc 92, creat 0.6, K 4.4  TSH 3.4, Hgb 13.8    Outside labs dated 9/17/2009 18 were reviewed.  Total cholesterol 221, HDL 71, triglycerides 99, , non-  Glucose 107  Creatinine 0.59  AST 15, ALT 14      Labs 6/1/2023:  Cholesterol 189, triglycerides 65, HDL 86, LDL 88, non-  Apo B 76  Vitamin D 82 (increased from 42 in 2021)  Glucose 102, HgbA1c 5.3   K 4.4, BUN 13, creatinine 0.52, AST 20, ALT 19  WBC 3.4, H/H 13.8/42.6, Plt 212    Imaging    Stress echocardiogram 4/27/2021:  - Exam indication: Abnormal ECG   - The exercise stress echo was negative for ischemia at 91 % of MPHR and good   workload achieved (9.3 METS).   - The left ventricle is normal in size. Left ventricular systolic function is   normal. EF = 56 ± 5% (2D biplane) Normal left ventricular diastolic function.   - The right ventricle is normal in size. Right ventricular systolic function is   normal.   - There are no significant valvular abnormalities.   - The patient has not had a prior CC echocardiographic exam for comparison    CAC 2019. 0  Coronary calcium score June 2016.  Composite score of 0.  Noncardiac portions were normal.      6/5/2023       I spent 35 minutes on this date of service performing the following activities: obtaining history, performing  examination, entering orders, documenting, preparing for visit, obtaining / reviewing records, providing counseling and education including regarding diet and lifestyle changes to address cardiovascular risk, independently reviewing study/studies, communicating results, and coordinating care

## 2023-06-05 NOTE — LETTER
June 6, 2023     Gilbert Hernandez, DO  [address]  [place] [place] [address]  [facility] [address]    Patient: Hannah Hester  YOB: 1960  Date of Visit: 6/5/2023      Dear Dr. Hernandez:    Thank you for referring Hannah Hester to me for evaluation. Below are my notes for this consultation.    If you have questions, please do not hesitate to call me. I look forward to following your patient along with you.         Sincerely,        Odilia Ng MD        CC: No Recipients    Odilia Ng MD  6/6/2023  3:53 PM  Signed       Interventional Cardiology           HPI    Last office visit 10/8/2021  - seen by cardiologist in [place] and [place]- hD STRESS TEST    Seen at [facility] for [facility] surgery in [place] for left knee pain 2/2022 after knee injury with small skin.  Had arthrocentesis    Occasional palp - skipped beat  Occasional dizziness with lying in bed and turning head side to side and sometimes with getting up from Pilates - resolves in seconds  Uses CPAP nightly  Occasional blood on tissue from hemorrhoids  Muscle pains from activies - occasional pain in back of right shoulder from Crestor - holds it for a week and then able to resume, less of an issue in past year, getting more massage    No chest pain, leg swelling, orthopnea, PND, fainting, black tarry stools, blood in urine,   Weight decreased 9.9 lbs since her last visit - weight had actually increased to 132 when she went on OPtiVia (64 oz of water, muscle cramping- cut out dairy and junk  Lean and green meals and Optiva bars  Cut out red wine - using tequila and lime juice  Exercise - Pilates 2X/wk, tennis 5X/week, Pickleball 2X/week and riding for  minutes at least 3-4 times a week.    All recent available medical records were reviewed.    Past Medical History:   -Dyslipidemia.  Statin intolerant on crestor 20 mg.  Does tolerate red yeast rice 2400 mg daily.  -Baseline abnormal EKG  -Mitral valve prolapse.  Clinically  silent.  -Prediabetes.  Fasting blood glucose 107.  -Had previously been on synthroid to help with weight loss. Sounds like she may have had antibodies. Endocrinologist- Mira [facility]gu[facility]. Then stopped synthroid with no change    ______________________________________________________  Assessment/Plan     Dyslipidemia with family history of CAD, Lp(a) 143 nmol/L.    She is asymptomatic with a coronary calcium score of 0 in 2016 and still 0 in 2019.    Previously statin intolerant (crestor 20). Ok on crestor 10 though does feel the need to hold it for a few weeks every once in a while for neck/shoulder spasm though she is starting to think this is not related. LDL-C goal<100 reasonable for now given CAC 0  She has improved metabolic parameters as well (Glc 92)    Abnormal EKG   With nonspecific T wave changes which has been stable.    History of mitral valve prolapse.    Previously with palpitations but currently asymptomatic.  No murmur on exam. She had a treadmill stress test with her cardiologist in [place] and was told it was normal, we will try to get results  She is extremely active with no symptoms at all  ______________________________________________________    Family History: Both grandfathers with early CAD.  Father had high cholesterol but  of MM in 40s. Mother may have had a mini stroke at age 85 also dementia. Sister with high cholesterol not on any medications.  Son diagnosed with T1DM at age 25    Social History: Lucia is  with 2 sons.  She lives in [place] half the year.  She has never been a smoker.  working from home. Her one son has Type 1 diabetes. Loves tennis and riding horses.    Current Medications:     Current Outpatient Medications:   •  aspirin 81 mg chewable tablet, Take 81 mg by mouth daily., Disp: , Rfl:   •  azelastine (ASTELIN) 137 mcg (0.1 %) nasal spray, Administer 2 sprays into each nostril 2 (two) times a day., Disp: , Rfl: 11  •  cetirizine  HCl (ZYRTEC ORAL), Take by mouth. 1/2 tablet in the evening , Disp: , Rfl:   •  cetirizine HCl/pseudoephedrine (ZYRTEC-D ORAL), Take by mouth. 1/2 tablet in the a.m., Disp: , Rfl:   •  cholecalciferol, vitamin D3, (VITAMIN D3) 5,000 unit tablet, Take 5,000 Units by mouth daily., Disp: , Rfl:   •  coenzyme Q10 (CO Q-10) 200 mg capsule, Take 200 mg by mouth daily., Disp: , Rfl:   •  fluticasone prp-sod.chl,bicarb 50 mcg- 0.9 % kit,spray suspension and spray, Administer 1 spray into affected nostril(s) 2 (two) times a day., Disp: , Rfl:   •  MAGNESIUM ORAL, Take 150 mg by mouth nightly.  , Disp: , Rfl:   •  omega 3-dha-epa-fish oil (FISH OIL) 1,000 mg (120 mg-180 mg) capsule, Take 1 tablet by mouth 2 (two) times a day., Disp: , Rfl:   •  PARoxetine CR (PAXIL-CR) 12.5 mg 24 hr tablet, Take 0.5 tablets by mouth daily., Disp: , Rfl: 1  •  rosuvastatin (CRESTOR) 10 mg tablet, TAKE 1 TABLET BY MOUTH EVERY DAY IN THE EVENING, Disp: 90 tablet, Rfl: 3  •  valACYclovir (VALTREX) 500 mg tablet, Take 500 mg by mouth daily., Disp: , Rfl:   •  VITAMIN D3-VITAMIN K2, MK4, ORAL, Take 1 tablet by mouth daily., Disp: , Rfl:     Current Supplements:   Fish oil ReQ 1250 mg bid  Systemic enzymes (wobenzyme)  DIM  (prescribed choice- 150 mg bid)  Probiotic  Vit D/K2    Allergies:  Povidone-iodine and Sulfa (sulfonamide antibiotics)    14 point review of systems is negative except as noted in HPI    Objective     Vitals:    06/05/23 1353   BP: 113/70   Pulse: 74   Resp: 18   SpO2: 98%       Wt Readings from Last 3 Encounters:   06/05/23 54.4 kg (120 lb)   10/08/21 58.9 kg (129 lb 14.4 oz)   07/20/20 57.9 kg (127 lb 9.6 oz)       Physical Exam:  General Appearance:  Alert, no distress   Head:  Normocephalic, without obvious abnormality, atraumatic   Eyes:  Conjunctiva/corneas clear, EOM's intact   Neck: No thyroid enlargement. JVD<8. No bruits    Lungs:   Clear to auscultation bilaterally   Heart:  Regular rhythm,no murmur heard   Abdomen:    Soft, non-tender, no masses, no organomegaly   Vascular: Pulses 2+ and symmetric all extremities, no carotid bruit or jugular vein distention   Musculoskeletal:  Skin: No injury or deformity  Skin color, texture, turgor normal, no rashes or lesions, no cyanosis    Extremities: No edema   Behavior/Emotional: Appropriate, cooperative   Neurologic:                    AAOx3, grossly intact neurologically    ECG   Sinus rhythm at 62 bpm.  Nonspecific ST and T wave abnormalities that have been stable.    Labs   Lab Results   Component Value Date    WBC 3.4 (L) 06/01/2023    HGB 13.8 06/01/2023    HCT 42.6 06/01/2023     06/01/2023    CHOL 189 06/01/2023    TRIG 65 06/01/2023    HDL 86 06/01/2023    LDLCALC 88 06/01/2023    ALT 19 06/01/2023    AST 20 06/01/2023     06/01/2023    K 4.4 06/01/2023     06/01/2023    CREATININE 0.52 06/01/2023    BUN 13 06/01/2023    CO2 28 06/01/2023    TSH 2.13 05/31/2023    HGBA1C 5.3 06/01/2023     Labs 7/22/2021:  Cholesterol 206, triglycerides 57, HDL 95, LDL 97, non-  Apo B 84  Vitamin D 42  TSH 2.29  Na 144, K 4.4, Cl 107, CO2 22, BUN 19, creatinine 0.55, glucose 104, Hgb A1c 5.1, ALT 18, AST 18    Labs 7/2020.  , HDL 86, TG 60, LDL 99, nonHDL 114  Glc 92, creat 0.6, K 4.4  TSH 3.4, Hgb 13.8    Outside labs dated 9/17/2009 18 were reviewed.  Total cholesterol 221, HDL 71, triglycerides 99, , non-  Glucose 107  Creatinine 0.59  AST 15, ALT 14      Labs 6/1/2023:  Cholesterol 189, triglycerides 65, HDL 86, LDL 88, non-  Apo B 76  Vitamin D 82 (increased from 42 in 2021)  Glucose 102, HgbA1c 5.3   K 4.4, BUN 13, creatinine 0.52, AST 20, ALT 19  WBC 3.4, H/H 13.8/42.6, Plt 212    Imaging    Stress echocardiogram 4/27/2021:  - Exam indication: Abnormal ECG   - The exercise stress echo was negative for ischemia at 91 % of MPHR and good   workload achieved (9.3 METS).   - The left ventricle is normal in size. Left ventricular systolic  function is   normal. EF = 56 ± 5% (2D biplane) Normal left ventricular diastolic function.   - The right ventricle is normal in size. Right ventricular systolic function is   normal.   - There are no significant valvular abnormalities.   - The patient has not had a prior CC echocardiographic exam for comparison    CAC 2019. 0  Coronary calcium score June 2016.  Composite score of 0.  Noncardiac portions were normal.      6/5/2023       I spent 35 minutes on this date of service performing the following activities: obtaining history, performing examination, entering orders, documenting, preparing for visit, obtaining / reviewing records, providing counseling and education including regarding diet and lifestyle changes to address cardiovascular risk, independently reviewing study/studies, communicating results, and coordinating care

## 2023-08-28 ENCOUNTER — TELEPHONE (OUTPATIENT)
Dept: SCHEDULING | Facility: CLINIC | Age: 63
End: 2023-08-28
Payer: COMMERCIAL

## 2023-08-28 NOTE — TELEPHONE ENCOUNTER
Pt calling     Requesting recent medical records be sent to Dr.Jean Birmingham office pad[place] she has her appt tomorrow    F:[phone]

## 2023-08-30 ENCOUNTER — TRANSCRIBE ORDERS (OUTPATIENT)
Dept: SCHEDULING | Age: 63
End: 2023-08-30

## 2023-08-30 DIAGNOSIS — R01.1 CARDIAC MURMUR, UNSPECIFIED: Primary | ICD-10-CM

## 2023-08-30 DIAGNOSIS — E04.1 NONTOXIC SINGLE THYROID NODULE: Primary | ICD-10-CM

## 2023-08-30 DIAGNOSIS — Z78.0 ASYMPTOMATIC MENOPAUSAL STATE: ICD-10-CM

## 2023-09-19 ENCOUNTER — HOSPITAL ENCOUNTER (OUTPATIENT)
Dept: RADIOLOGY | Facility: HOSPITAL | Age: 63
Discharge: HOME | End: 2023-09-19
Attending: INTERNAL MEDICINE
Payer: COMMERCIAL

## 2023-09-19 DIAGNOSIS — E04.1 NONTOXIC SINGLE THYROID NODULE: ICD-10-CM

## 2023-09-19 PROCEDURE — 76536 US EXAM OF HEAD AND NECK: CPT

## 2025-05-20 ENCOUNTER — TELEPHONE (OUTPATIENT)
Dept: SCHEDULING | Facility: CLINIC | Age: 65
End: 2025-05-20
Payer: MEDICARE

## 2025-05-20 DIAGNOSIS — R73.01 IMPAIRED FASTING GLUCOSE: ICD-10-CM

## 2025-05-20 DIAGNOSIS — D72.819 LEUKOPENIA, UNSPECIFIED TYPE: ICD-10-CM

## 2025-05-20 DIAGNOSIS — E55.9 VITAMIN D INSUFFICIENCY: ICD-10-CM

## 2025-05-20 DIAGNOSIS — R00.2 PALPITATIONS: ICD-10-CM

## 2025-05-20 DIAGNOSIS — E78.41 ELEVATED LP(A): ICD-10-CM

## 2025-05-20 DIAGNOSIS — E78.00 HYPERCHOLESTEROLEMIA: Primary | ICD-10-CM

## 2025-05-20 DIAGNOSIS — Z79.82 LONG TERM (CURRENT) USE OF ASPIRIN: ICD-10-CM

## 2025-07-07 ENCOUNTER — TELEPHONE (OUTPATIENT)
Dept: SCHEDULING | Facility: CLINIC | Age: 65
End: 2025-07-07
Payer: MEDICARE

## 2025-07-09 ENCOUNTER — LAB REQUISITION (OUTPATIENT)
Dept: LAB | Facility: HOSPITAL | Age: 65
End: 2025-07-09
Attending: INTERNAL MEDICINE
Payer: MEDICARE

## 2025-07-09 DIAGNOSIS — Z01.818 ENCOUNTER FOR OTHER PREPROCEDURAL EXAMINATION: ICD-10-CM

## 2025-07-09 LAB
BACTERIA URNS QL MICRO: ABNORMAL /HPF
BILIRUB UR QL STRIP.AUTO: NEGATIVE MG/DL
CLARITY UR REFRACT.AUTO: CLEAR
COLOR UR AUTO: YELLOW
GLUCOSE UR STRIP.AUTO-MCNC: NEGATIVE MG/DL
HGB UR QL STRIP.AUTO: NEGATIVE
HYALINE CASTS #/AREA URNS LPF: ABNORMAL /LPF
KETONES UR STRIP.AUTO-MCNC: NEGATIVE MG/DL
LEUKOCYTE ESTERASE UR QL STRIP.AUTO: NEGATIVE
MUCOUS THREADS URNS QL MICRO: ABNORMAL /LPF
NITRITE UR QL STRIP.AUTO: NEGATIVE
PH UR STRIP.AUTO: 6 [PH]
PROT UR QL STRIP.AUTO: NEGATIVE
RBC #/AREA URNS HPF: ABNORMAL /HPF
SP GR UR REFRACT.AUTO: 1.01
SQUAMOUS URNS QL MICRO: ABNORMAL /HPF
UROBILINOGEN UR STRIP-ACNC: 0.2 EU/DL
WBC #/AREA URNS HPF: ABNORMAL /HPF

## 2025-07-09 PROCEDURE — 81001 URINALYSIS AUTO W/SCOPE: CPT | Performed by: INTERNAL MEDICINE

## 2025-07-11 ENCOUNTER — OFFICE VISIT (OUTPATIENT)
Dept: CARDIOLOGY | Facility: CLINIC | Age: 65
End: 2025-07-11
Payer: MEDICARE

## 2025-07-11 VITALS
HEIGHT: 61 IN | OXYGEN SATURATION: 96 % | BODY MASS INDEX: 25.11 KG/M2 | HEART RATE: 83 BPM | SYSTOLIC BLOOD PRESSURE: 112 MMHG | RESPIRATION RATE: 18 BRPM | WEIGHT: 133 LBS | DIASTOLIC BLOOD PRESSURE: 78 MMHG

## 2025-07-11 DIAGNOSIS — E78.00 HYPERCHOLESTEROLEMIA: Primary | ICD-10-CM

## 2025-07-11 DIAGNOSIS — E78.41 ELEVATED LP(A): ICD-10-CM

## 2025-07-11 DIAGNOSIS — Z82.49 FAMILY HISTORY OF PREMATURE CORONARY ARTERY DISEASE: ICD-10-CM

## 2025-07-11 LAB
ATRIAL RATE: 83
P AXIS: 72
PR INTERVAL: 166
QRS DURATION: 78
QT INTERVAL: 368
QTC CALCULATION(BAZETT): 432
R AXIS: 8
T WAVE AXIS: 13
VENTRICULAR RATE: 83

## 2025-07-11 PROCEDURE — 93000 ELECTROCARDIOGRAM COMPLETE: CPT | Performed by: INTERNAL MEDICINE

## 2025-07-11 PROCEDURE — 99215 OFFICE O/P EST HI 40 MIN: CPT | Performed by: INTERNAL MEDICINE

## 2025-07-11 PROCEDURE — G2211 COMPLEX E/M VISIT ADD ON: HCPCS | Performed by: INTERNAL MEDICINE

## 2025-07-11 RX ORDER — TRAMADOL HYDROCHLORIDE 50 MG/1
TABLET, FILM COATED ORAL
COMMUNITY

## 2025-07-11 RX ORDER — ONDANSETRON 8 MG/1
TABLET, ORALLY DISINTEGRATING ORAL
COMMUNITY
Start: 2025-07-09

## 2025-07-11 RX ORDER — EZETIMIBE 10 MG/1
10 TABLET ORAL SEE ADMIN INSTRUCTIONS
COMMUNITY

## 2025-07-15 ENCOUNTER — TELEPHONE (OUTPATIENT)
Dept: CARDIOLOGY | Facility: CLINIC | Age: 65
End: 2025-07-15
Payer: MEDICARE

## 2025-07-15 ENCOUNTER — TELEPHONE (OUTPATIENT)
Dept: CARDIOLOGY | Facility: CLINIC | Age: 65
End: 2025-07-15

## 2025-08-07 ENCOUNTER — TELEPHONE (OUTPATIENT)
Dept: SCHEDULING | Facility: CLINIC | Age: 65
End: 2025-08-07
Payer: MEDICARE

## 2025-08-07 NOTE — TELEPHONE ENCOUNTER
Dr Ng patient had calcium score yesterday  at [facility] in [facility].    [facility] imaging    Patient has report and is faxing this now to [phone].    Please watch for this report.    Patient is concerned about incidental findings.      Please call her at [phone]  to discuss. ty